# Patient Record
Sex: MALE | Race: WHITE | NOT HISPANIC OR LATINO | Employment: OTHER | URBAN - METROPOLITAN AREA
[De-identification: names, ages, dates, MRNs, and addresses within clinical notes are randomized per-mention and may not be internally consistent; named-entity substitution may affect disease eponyms.]

---

## 2017-02-21 ENCOUNTER — OFFICE VISIT (OUTPATIENT)
Dept: NEUROLOGY | Facility: CLINIC | Age: 67
End: 2017-02-21
Payer: MEDICARE

## 2017-02-21 VITALS
DIASTOLIC BLOOD PRESSURE: 72 MMHG | SYSTOLIC BLOOD PRESSURE: 132 MMHG | HEART RATE: 66 BPM | WEIGHT: 178.81 LBS | BODY MASS INDEX: 31.68 KG/M2 | HEIGHT: 63 IN

## 2017-02-21 DIAGNOSIS — G25.0 ESSENTIAL TREMOR: Primary | ICD-10-CM

## 2017-02-21 PROCEDURE — 99203 OFFICE O/P NEW LOW 30 MIN: CPT | Mod: PBBFAC | Performed by: PSYCHIATRY & NEUROLOGY

## 2017-02-21 PROCEDURE — 99205 OFFICE O/P NEW HI 60 MIN: CPT | Mod: S$PBB,,, | Performed by: PSYCHIATRY & NEUROLOGY

## 2017-02-21 PROCEDURE — 99999 PR PBB SHADOW E&M-NEW PATIENT-LVL III: CPT | Mod: PBBFAC,,, | Performed by: PSYCHIATRY & NEUROLOGY

## 2017-02-21 RX ORDER — AMILORIDE HYDROCHLORIDE 5 MG/1
5 TABLET ORAL DAILY
Refills: 3 | Status: ON HOLD | COMMUNITY
End: 2023-02-08 | Stop reason: HOSPADM

## 2017-02-21 RX ORDER — ATENOLOL 50 MG/1
50 TABLET ORAL DAILY
Refills: 1 | Status: ON HOLD | COMMUNITY
End: 2023-02-08 | Stop reason: HOSPADM

## 2017-02-21 RX ORDER — OMEPRAZOLE 40 MG/1
40 CAPSULE, DELAYED RELEASE ORAL DAILY
Refills: 3 | Status: ON HOLD | COMMUNITY
End: 2023-03-07 | Stop reason: HOSPADM

## 2017-02-21 RX ORDER — FUROSEMIDE 40 MG/1
40 TABLET ORAL DAILY
Refills: 3 | COMMUNITY

## 2017-02-21 RX ORDER — LACTULOSE 10 G/15ML
15 SOLUTION ORAL; RECTAL 3 TIMES DAILY
Refills: 3 | COMMUNITY
Start: 2017-01-17 | End: 2023-02-05 | Stop reason: CLARIF

## 2017-02-21 NOTE — PROGRESS NOTES
Name: Regino Cowan  MRN: [id number]   CSN: [id number]      Date: 02/21/2017    Chief Complaint / Interval History:  tremors    History of Present Illness (HPI):  65 yo RH here with tremors, recovering alcoholic (sober for 11 years).  Moved here 2 months ago to be closer to her daughter who lives here ([place]).      Tremor of hands for about a year, sl ow progerssive, with posture and kinesis, sl worse on R.  Affects adls.  Frustrating.  NO falls or near falls.  Very rarely at rest.    Opening [facility] in [place] soon.    Will email:  Contact information  [address] [place] [address]  Office: [phone]  Fax: [phone]  Email:   [email or web address]     Kayla VELASCO DO  Family practice physician in [place], [place]  Address: [address] [place] [address] [address], [place] [address]  Phone: [phone]      Nonmotor/Premotor ROS:  Hyposmia (HENT)?No  RBD/sleep issues (Constitutional)?No  Depression/anxiety (Psychiatric)?No  Fatigue (Constitutional)?Yes  Constipation (GI)?No  Urinary issues ()?No  Sexual dysfunction ()?No  Orthostasis (Cardiovascular)?No  Leg swelling (Cardiovascular)? No  Falls (Musculoskeletal)?No  Cognitive impairment (Neurologic)?No  Psychoses (Psychiatric)?No  Pain/Paresthesia (Neurologic)?No  Visual changes (Eyes)?No  Moles / skin changes (Skin)?No  Stridor / SOB (Pulm)?No  Bruising (Heme)?No    Past Medical History: The patient  has no past medical history on file.    Social History: The patient  reports that he has quit smoking. He has never used smokeless tobacco. He reports that he does not drink alcohol or use illicit drugs.    Family History: Their family history is not on file.    Allergies: Review of patient's allergies indicates no known allergies.     Meds:   No current outpatient prescriptions on file prior to visit.     No current facility-administered medications on file prior to visit.        Exam:  Visit Vitals    /72 (BP  "Location: Left arm, Patient Position: Sitting, BP Method: Automatic)    Pulse 66    Ht 5' 3" (1.6 m)    Wt 81.1 kg (178 lb 12.7 oz)    BMI 31.67 kg/m2       Constitutional  Well-developed, well-nourished, appears stated age   Ophthalmoscopic  No papilledema with no hemorrhages or exudates bilaterally   Cardiovascular  Radial pulses 2+ and symmetric, no LE edema bilaterally   Neurological    * Mental status  MOCA deferred     - Orientation  Oriented to person, place, time, and situation     - Memory   Intact recent and remote     - Attention/concentration  Attentive, vigilant during exam     - Language  Naming & repetition intact, +2-step commands     - Fund of knowledge  Aware of current events     - Executive  Well-organized thoughts     - Other     * Cranial nerves       - CN II  PERRL, visual fields full to confrontation     - CN III, IV, VI  Extraocular movements full, normal pursuits and saccades     - CN V  Sensation V1 - V3 intact     - CN VII  Face strong and symmetric bilaterally     - CN VIII  Hearing intact bilaterally     - CN IX, X  Palate raises midline and symmetric     - CN XI  SCM and trapezius 5/5 bilaterally     - CN XII  Tongue midline   * Motor  Muscle bulk normal, strength 5/5 throughout   * Sensory   Intact to temperature and vibration throughout   * Coordination  No dysmetria with finger-to-nose or heel-to-shin   * Gait  See below.   * Deep tendon reflexes  2+ and symmetric throughout   Babinski downgoing bilaterally   * Specialized movement exam  Sl gravelly speech.   V. Mild facial masking.   No cogwheel rigidity.     No bradykinesia.   Mod postural and kinetic tremor of hands, none with rest or intention.  Arch spirals drawn for records.   No other dystonia, chorea, athetosis, myoclonus, or tics.   No motor impersistence.   Normal-based gait.   No shortened stride length.   + Minmially less R arm swing.     No postural instability.      Laboratory/Radiological:  - Results:No results " found for any previous visit.    - Independent review of images:    Diagnoses:          1) Probable essential tremor.  Could have rare myoclonic intrusions.  No convincing parkinsonism though a bit masked in the face.  Could also have uncovered and/or worsened if has hepatic-renal metabolic issues.    Medical Decision Making:  - reversible labs today  - no indication for imaging  - OT as needed  - will discuss add'l therapy after labs completed      James Palacios MD, MPH  Division of Movement and Memory Disorders  [facility]  [phone]    ====================  Results for ANA CAMACHO (MRN [id number]) as of 2/26/2017 15:36   Ref. Range 2/21/2017 16:24 2/21/2017 16:24   WBC Latest Ref Range: 3.90 - 12.70 K/uL 5.75    RBC Latest Ref Range: 4.60 - 6.20 M/uL 4.22 (L)    Hemoglobin Latest Ref Range: 14.0 - 18.0 g/dL 13.2 (L)    Hematocrit Latest Ref Range: 40.0 - 54.0 % 37.4 (L)    MCV Latest Ref Range: 82 - 98 fL 89    MCH Latest Ref Range: 27.0 - 31.0 pg 31.3 (H)    MCHC Latest Ref Range: 32.0 - 36.0 % 35.3    RDW Latest Ref Range: 11.5 - 14.5 % 13.4    Platelets Latest Ref Range: 150 - 350 K/uL 124 (L)    MPV Latest Ref Range: 9.2 - 12.9 fL 11.2    Gran% Latest Ref Range: 38.0 - 73.0 % 50.2    Gran # Latest Ref Range: 1.8 - 7.7 K/uL 2.9    Lymph% Latest Ref Range: 18.0 - 48.0 % 32.2    Lymph # Latest Ref Range: 1.0 - 4.8 K/uL 1.9    Mono% Latest Ref Range: 4.0 - 15.0 % 11.7    Mono # Latest Ref Range: 0.3 - 1.0 K/uL 0.7    Eosinophil% Latest Ref Range: 0.0 - 8.0 % 5.0    Eos # Latest Ref Range: 0.0 - 0.5 K/uL 0.3    Basophil% Latest Ref Range: 0.0 - 1.9 % 0.7    Baso # Latest Ref Range: 0.00 - 0.20 K/uL 0.04    Sodium Latest Ref Range: 136 - 145 mmol/L 140    Potassium Latest Ref Range: 3.5 - 5.1 mmol/L 3.7    Chloride Latest Ref Range: 95 - 110 mmol/L 108    CO2 Latest Ref Range: 23 - 29 mmol/L 27    Anion Gap Latest Ref Range: 8 - 16 mmol/L 5 (L)    BUN, Bld Latest Ref Range: 8 - 23 mg/dL  13    Creatinine Latest Ref Range: 0.5 - 1.4 mg/dL 1.6 (H)    eGFR if non African American Latest Ref Range: >60 mL/min/1.73 m^2 43.9 (A)    eGFR if African American Latest Ref Range: >60 mL/min/1.73 m^2 50.8 (A)    Glucose Latest Ref Range: 70 - 110 mg/dL 84    Calcium Latest Ref Range: 8.7 - 10.5 mg/dL 8.9    Alkaline Phosphatase Latest Ref Range: 55 - 135 U/L 120 120   Total Protein Latest Ref Range: 6.0 - 8.4 g/dL 6.9 6.9   Albumin Latest Ref Range: 3.5 - 5.2 g/dL 3.4 (L) 3.4 (L)   Total Bilirubin Latest Ref Range: 0.1 - 1.0 mg/dL 1.2 (H) 1.2 (H)   Bilirubin, Direct Latest Ref Range: 0.1 - 0.3 mg/dL  0.5 (H)   AST Latest Ref Range: 10 - 40 U/L 30 30   ALT Latest Ref Range: 10 - 44 U/L 17 17   Ammonia Latest Ref Range: 10 - 50 umol/L 53 (H)    Amylase Latest Ref Range: 20 - 110 U/L 119 (H)    Lipase Latest Ref Range: 4 - 60 U/L 70 (H)

## 2017-02-21 NOTE — MR AVS SNAPSHOT
[facility]  [address]  [facility] [address]  Phone: [phone]  Fax: [phone]                  Regino Cowan   2017 3:20 PM   Office Visit    Description:  Male : 1950   Provider:  James Palacios MD   Department:  [facility]           Diagnoses this Visit        Comments    Essential tremor    -  Primary            To Do List           Future Appointments        Provider Department Dept Phone    2017 5:00 PM LAB, SAME DAY [facility] [phone]    2017 4:00 PM James Palacios MD [facility] [phone]      Goals (5 Years of Data)     None      [facility]     [facility] Nurse Care Line -  Assistance  Registered nurses in the [facility] provide clinical advisement, health education, appointment booking, and other advisory services.  Call for this free service at [phone].             Medications           Message regarding Medications     Verify the changes and/or additions to your medication regime listed below are the same as discussed with your clinician today.  If any of these changes or additions are incorrect, please notify your healthcare provider.             Verify that the below list of medications is an accurate representation of the medications you are currently taking.  If none reported, the list may be blank. If incorrect, please contact your healthcare provider. Carry this list with you in case of emergency.           Current Medications     amiloride (MIDAMOR) 5 MG Tab Take 5 mg by mouth 4 (four) times daily.    atenolol (TENORMIN) 50 MG tablet Take 50 mg by mouth once daily.    furosemide (LASIX) 40 MG tablet Take 40 mg by mouth 3 (three) times daily.    GENERLAC 10 gram/15 mL solution Take 1 Dose by mouth 3 (three) times daily.    multivitamin capsule Take 1 capsule by mouth once daily.    omeprazole (PRILOSEC) 40 MG capsule Take 40 mg by mouth once daily.          "  Clinical Reference Information           Your Vitals Were     BP Pulse Height Weight BMI    132/72 (BP Location: Left arm, Patient Position: Sitting, BP Method: Automatic) 66 5' 3" (1.6 m) 81.1 kg (178 lb 12.7 oz) 31.67 kg/m2      Blood Pressure          Most Recent Value    BP  132/72      Allergies as of 2/21/2017     No Known Allergies      Immunizations Administered on Date of Encounter - 2/21/2017     None      Orders Placed During Today's Visit     Future Labs/Procedures Expected by Expires    Ammonia  2/21/2017 4/22/2018    Amylase  2/21/2017 4/22/2018    CBC auto differential  2/21/2017 4/22/2018    Comprehensive metabolic panel  2/21/2017 4/22/2018    HEPATIC FUNCTION PANEL  2/21/2017 4/22/2018    LIPASE  2/21/2017 4/22/2018      Language Assistance Services     ATTENTION: Language assistance services are available, free of charge. Please call [phone].      ATENCIÓN: Si habla español, tiene a hernandez disposición servicios gratuitos de asistencia lingüística. Llame al [phone].     CHÚ Ý: N?u b?n nói Ti?ng Vi?t, có các d?ch v? h? tr? ngôn ng? mi?n phí dành cho b?n. G?i s? [phone].         [facility] complies with applicable Federal civil rights laws and does not discriminate on the basis of race, color, national origin, age, disability, or sex.        "

## 2017-05-22 ENCOUNTER — OFFICE VISIT (OUTPATIENT)
Dept: NEUROLOGY | Facility: CLINIC | Age: 67
End: 2017-05-22
Payer: MEDICARE

## 2017-05-22 VITALS
BODY MASS INDEX: 30.08 KG/M2 | DIASTOLIC BLOOD PRESSURE: 75 MMHG | HEIGHT: 63 IN | WEIGHT: 169.75 LBS | HEART RATE: 61 BPM | SYSTOLIC BLOOD PRESSURE: 131 MMHG

## 2017-05-22 DIAGNOSIS — G25.0 ESSENTIAL TREMOR: Primary | ICD-10-CM

## 2017-05-22 DIAGNOSIS — K86.89 PANCREATIC INSUFFICIENCY: ICD-10-CM

## 2017-05-22 PROCEDURE — 99215 OFFICE O/P EST HI 40 MIN: CPT | Mod: S$PBB,,, | Performed by: PSYCHIATRY & NEUROLOGY

## 2017-05-22 PROCEDURE — 99213 OFFICE O/P EST LOW 20 MIN: CPT | Mod: PBBFAC | Performed by: PSYCHIATRY & NEUROLOGY

## 2017-05-22 PROCEDURE — 99999 PR PBB SHADOW E&M-EST. PATIENT-LVL III: CPT | Mod: PBBFAC,,, | Performed by: PSYCHIATRY & NEUROLOGY

## 2017-05-22 NOTE — PROGRESS NOTES
Name: Regino Cowan  MRN: [id number]   CSN: [id number]      Date: 05/22/2017    Chief Complaint / Interval History:  tremors  - tremors have improved!  - he didn't get a call about the labs  - no abdom pain  - no diarrhea  - gait is normal    History of Present Illness (HPI):  67 yo RH here with tremors, recovering alcoholic (sober for 11 years).  Moved here 2 months ago to be closer to her daughter who lives here ([place]).      Tremor of hands for about a year, slow progressive, with posture and kinesis, sl worse on R.  Affects adls.  Frustrating.  NO falls or near falls.  Very rarely at rest.    Opening [facility] in [place] soon.    Will email:  Contact information  [address] [place] [address]  Office: [phone]  Fax: [phone]  Email:   [email or web address]     Kayla VELASCO DO  Family practice physician in [place], [place]  Address: [address] [place] [address] [address], [place] [address]  Phone: [phone]      Nonmotor/Premotor ROS:  Hyposmia (HENT)?No  RBD/sleep issues (Constitutional)?No  Depression/anxiety (Psychiatric)?No  Fatigue (Constitutional)?Yes  Constipation (GI)?No  Urinary issues ()?No  Sexual dysfunction ()?No  Orthostasis (Cardiovascular)?No  Leg swelling (Cardiovascular)? No  Falls (Musculoskeletal)?No  Cognitive impairment (Neurologic)?No  Psychoses (Psychiatric)?No  Pain/Paresthesia (Neurologic)?No  Visual changes (Eyes)?No  Moles / skin changes (Skin)?No  Stridor / SOB (Pulm)?No  Bruising (Heme)?No    Past Medical History: The patient  has no past medical history on file.    Social History: The patient  reports that he has quit smoking. He has never used smokeless tobacco. He reports that he does not drink alcohol or use drugs.    Family History: Their family history is not on file.    Allergies: Review of patient's allergies indicates no known allergies.     Meds:   Current Outpatient Prescriptions on File Prior to Visit   Medication Sig  "Dispense Refill    amiloride (MIDAMOR) 5 MG Tab Take 5 mg by mouth 4 (four) times daily.  3    atenolol (TENORMIN) 50 MG tablet Take 50 mg by mouth once daily.  1    furosemide (LASIX) 40 MG tablet Take 40 mg by mouth 3 (three) times daily.  3    GENERLAC 10 gram/15 mL solution Take 1 Dose by mouth 3 (three) times daily.  3    multivitamin capsule Take 1 capsule by mouth once daily.      omeprazole (PRILOSEC) 40 MG capsule Take 40 mg by mouth once daily.  3     No current facility-administered medications on file prior to visit.        Exam:  /75 (BP Location: Left arm, Patient Position: Sitting, BP Method: Automatic)   Pulse 61   Ht 5' 3" (1.6 m)   Wt 77 kg (169 lb 12.1 oz)   BMI 30.07 kg/m²     * Specialized movement exam  Sl gravelly speech.   V. Mild facial masking.   No cogwheel rigidity.     MILD B bradykinesia.   VERY MINIMAL postural and kinetic tremor of hands, none with rest or intention.      No other dystonia, chorea, athetosis, myoclonus, or tics.   No motor impersistence.   Normal-based gait.   No shortened stride length.   + Minmially less R arm swing.     No postural instability.      Laboratory/Radiological:  - Results:  No visits with results within 3 Month(s) from this visit.   Latest known visit with results is:   Lab Visit on 02/21/2017   Component Date Value Ref Range Status    WBC 02/21/2017 5.75  3.90 - 12.70 K/uL Final    RBC 02/21/2017 4.22* 4.60 - 6.20 M/uL Final    Hemoglobin 02/21/2017 13.2* 14.0 - 18.0 g/dL Final    Hematocrit 02/21/2017 37.4* 40.0 - 54.0 % Final    MCV 02/21/2017 89  82 - 98 fL Final    MCH 02/21/2017 31.3* 27.0 - 31.0 pg Final    MCHC 02/21/2017 35.3  32.0 - 36.0 % Final    RDW 02/21/2017 13.4  11.5 - 14.5 % Final    Platelets 02/21/2017 124* 150 - 350 K/uL Final    MPV 02/21/2017 11.2  9.2 - 12.9 fL Final    Gran # 02/21/2017 2.9  1.8 - 7.7 K/uL Final    Lymph # 02/21/2017 1.9  1.0 - 4.8 K/uL Final    Mono # 02/21/2017 0.7  0.3 - 1.0 K/uL " Final    Eos # 02/21/2017 0.3  0.0 - 0.5 K/uL Final    Baso # 02/21/2017 0.04  0.00 - 0.20 K/uL Final    Gran% 02/21/2017 50.2  38.0 - 73.0 % Final    Lymph% 02/21/2017 32.2  18.0 - 48.0 % Final    Mono% 02/21/2017 11.7  4.0 - 15.0 % Final    Eosinophil% 02/21/2017 5.0  0.0 - 8.0 % Final    Basophil% 02/21/2017 0.7  0.0 - 1.9 % Final    Differential Method 02/21/2017 Automated   Final    Sodium 02/21/2017 140  136 - 145 mmol/L Final    Potassium 02/21/2017 3.7  3.5 - 5.1 mmol/L Final    Chloride 02/21/2017 108  95 - 110 mmol/L Final    CO2 02/21/2017 27  23 - 29 mmol/L Final    Glucose 02/21/2017 84  70 - 110 mg/dL Final    BUN, Bld 02/21/2017 13  8 - 23 mg/dL Final    Creatinine 02/21/2017 1.6* 0.5 - 1.4 mg/dL Final    Calcium 02/21/2017 8.9  8.7 - 10.5 mg/dL Final    Total Protein 02/21/2017 6.9  6.0 - 8.4 g/dL Final    Albumin 02/21/2017 3.4* 3.5 - 5.2 g/dL Final    Total Bilirubin 02/21/2017 1.2* 0.1 - 1.0 mg/dL Final    Alkaline Phosphatase 02/21/2017 120  55 - 135 U/L Final    AST 02/21/2017 30  10 - 40 U/L Final    ALT 02/21/2017 17  10 - 44 U/L Final    Anion Gap 02/21/2017 5* 8 - 16 mmol/L Final    eGFR if  02/21/2017 50.8* >60 mL/min/1.73 m^2 Final    eGFR if non African American 02/21/2017 43.9* >60 mL/min/1.73 m^2 Final    Ammonia 02/21/2017 53* 10 - 50 umol/L Final    Amylase 02/21/2017 119* 20 - 110 U/L Final    Lipase 02/21/2017 70* 4 - 60 U/L Final    Total Protein 02/21/2017 6.9  6.0 - 8.4 g/dL Final    Albumin 02/21/2017 3.4* 3.5 - 5.2 g/dL Final    Total Bilirubin 02/21/2017 1.2* 0.1 - 1.0 mg/dL Final    Bilirubin, Direct 02/21/2017 0.5* 0.1 - 0.3 mg/dL Final    AST 02/21/2017 30  10 - 40 U/L Final    ALT 02/21/2017 17  10 - 44 U/L Final    Alkaline Phosphatase 02/21/2017 120  55 - 135 U/L Final     - Independent review of images:    Diagnoses:            1) Probable essential tremor, now much improved - but also has rare myoclonic intrusions. A  bit more parkinsonism on exam today, but greatest issue might be his pancreas.  Getting follow-up labs now.    Medical Decision Making:  - labs  - no new meds till labs  - PT/OT      James Palacios MD, MPH  Division of Movement and Memory Disorders  [facility]  [phone]

## 2017-05-22 NOTE — LETTER
May 25, 2017      ARIEL Garza  [address]  [address]  [facility] [address]           [facility]  [address]  [facility] [address]  Phone: [phone]  Fax: [phone]          Patient: Regino Cowan   MR Number: [id number]   YOB: 1950   Date of Visit: 5/22/2017       Dear Ariel Garza:    Thank you for referring Regino Cowan to me for evaluation. Attached you will find relevant portions of my assessment and plan of care.    If you have questions, please do not hesitate to call me. I look forward to following Regino Cowan along with you.    Sincerely,        Enclosure  CC:  No Recipients    If you would like to receive this communication electronically, please contact [email or web address] or [phone] to request more information on [employer] access.    For providers and/or their staff who would like to refer a patient to [facility], please contact us through our one-stop-shop provider referral line, Brynn Bonilla, at [phone].    If you feel you have received this communication in error or would no longer like to receive these types of communications, please e-mail [email or web address]

## 2017-05-30 ENCOUNTER — TELEPHONE (OUTPATIENT)
Dept: NEUROLOGY | Facility: CLINIC | Age: 67
End: 2017-05-30

## 2017-05-30 PROBLEM — K86.89 PANCREATIC INSUFFICIENCY: Status: ACTIVE | Noted: 2017-05-30

## 2017-05-30 PROBLEM — G25.0 ESSENTIAL TREMOR: Status: ACTIVE | Noted: 2017-05-30

## 2017-05-30 NOTE — TELEPHONE ENCOUNTER
Called patient to schedule his appointment with hepatology and he stated that he rather have you call him regarding his results before any appointment is made!

## 2017-05-31 NOTE — TELEPHONE ENCOUNTER
----- Message from Chanel Sapp sent at 5/31/2017 12:55 PM CDT -----  Contact: sheila @ [phone]  Pt says dr rivera had a woman call him and tell him to go see a hepatologist.  He would like to know why he wants him to see this type of dr. garcía call.   Pt says it is urgent.

## 2017-06-02 ENCOUNTER — TELEPHONE (OUTPATIENT)
Dept: NEUROLOGY | Facility: CLINIC | Age: 67
End: 2017-06-02

## 2017-06-02 NOTE — TELEPHONE ENCOUNTER
----- Message from Tyesha España sent at 6/1/2017  2:35 PM CDT -----  Contact: Kimberly/ Dr. Arguello staff  Caller is requesting PT's lab results.     They can be faxed over;   [phone].    You can reach Kimberly at [phone].

## 2017-06-02 NOTE — TELEPHONE ENCOUNTER
Called Kimberly and left a voicemail informing her that DR. Palacios is out of the office and before any information is faxed off I will have to speak with him first.

## 2017-06-16 ENCOUNTER — TELEPHONE (OUTPATIENT)
Dept: NEUROLOGY | Facility: CLINIC | Age: 67
End: 2017-06-16

## 2017-06-16 NOTE — TELEPHONE ENCOUNTER
Spoken to patient and she stated that he would like Dr. Palacios to call him regarding his lab results and that he would also like his labs to be faxed over to [facility] to Dr. Lowe.

## 2017-06-16 NOTE — TELEPHONE ENCOUNTER
----- Message from Gavi Marshall sent at 6/16/2017 10:19 AM CDT -----  Contact: Pt  Pt states appts were to be made on his behalf and  was to facetime him as well. He hasnt heard from anyone and is checking the status on things    Pt contact number [phone]  Thanks

## 2017-08-17 ENCOUNTER — TELEPHONE (OUTPATIENT)
Dept: NEUROLOGY | Facility: CLINIC | Age: 67
End: 2017-08-17

## 2017-08-17 DIAGNOSIS — G25.0 ESSENTIAL TREMOR: Primary | ICD-10-CM

## 2017-08-17 NOTE — TELEPHONE ENCOUNTER
"Long discussion.  I HAD called him before and we discussed the results (he forgot)!  He saw the docs at [facility] and they said his "labs were better" when he went up there.    Just talked to him again.    We need to send his labs from here and get the ones from there please!  Thanks Michelle!    glenys  "

## 2017-08-17 NOTE — TELEPHONE ENCOUNTER
----- Message from Felicity Meng sent at 8/17/2017 12:28 PM CDT -----  Contact: Self  Pt is calling regarding his test results.  Pt says he had the tests done several months ago and has not heard from anyone.   Says he's called several times since the tests.    He can be reached at [phone].    Thank you.

## 2017-08-21 NOTE — TELEPHONE ENCOUNTER
See other notes. Spoke to him in detail. We had previously missed each other. His liver and pancreatic function been followed  in [place]. Awaiting those records.

## 2018-02-08 PROBLEM — J18.9 PNEUMONIA OF RIGHT LOWER LOBE DUE TO INFECTIOUS ORGANISM: Status: ACTIVE | Noted: 2018-02-08

## 2018-02-08 PROBLEM — D72.829 LEUKOCYTOSIS: Status: ACTIVE | Noted: 2018-02-08

## 2022-05-11 PROBLEM — Z71.89 ACP (ADVANCE CARE PLANNING): Status: ACTIVE | Noted: 2022-05-11

## 2022-05-11 PROBLEM — I10 PRIMARY HYPERTENSION: Status: ACTIVE | Noted: 2022-05-11

## 2022-05-11 PROBLEM — E87.6 HYPOKALEMIA: Status: ACTIVE | Noted: 2022-05-11

## 2022-05-11 PROBLEM — G93.41 ACUTE METABOLIC ENCEPHALOPATHY: Status: ACTIVE | Noted: 2022-05-11

## 2023-02-05 PROBLEM — K76.82 HEPATIC ENCEPHALOPATHY: Status: ACTIVE | Noted: 2023-02-05

## 2023-02-05 PROBLEM — D69.6 THROMBOCYTOPENIA: Status: ACTIVE | Noted: 2023-02-05

## 2023-02-17 PROBLEM — N18.31 STAGE 3A CHRONIC KIDNEY DISEASE: Status: ACTIVE | Noted: 2023-02-17

## 2023-02-19 PROBLEM — R53.81 DEBILITY: Status: ACTIVE | Noted: 2023-02-19

## 2023-02-27 PROBLEM — Z00.8 ENCOUNTER FOR ASSESSMENT OF DECISION-MAKING CAPACITY: Status: ACTIVE | Noted: 2023-02-27

## 2023-03-13 ENCOUNTER — TELEPHONE (OUTPATIENT)
Dept: HEMATOLOGY/ONCOLOGY | Facility: CLINIC | Age: 73
End: 2023-03-13
Payer: MEDICARE

## 2023-03-13 NOTE — TELEPHONE ENCOUNTER
----- Message from Sara Beltre sent at 3/13/2023 10:53 AM CDT -----  Regarding: Pt Adv  Contact: [phone]  Pt's daughter calling to get refer for TIPS revision.   Please call and adv @ [phone]

## 2023-03-14 ENCOUNTER — TELEPHONE (OUTPATIENT)
Dept: TRANSPLANT | Facility: CLINIC | Age: 73
End: 2023-03-14
Payer: MEDICARE

## 2023-03-14 PROBLEM — Z75.8 DISCHARGE PLANNING ISSUES: Status: ACTIVE | Noted: 2023-03-14

## 2023-03-14 NOTE — TELEPHONE ENCOUNTER
Patient's daughter called and notified that we have received about 8 pages of medical records.  Information about the appointment reviewed again. Questions answered at this time.

## 2023-03-14 NOTE — TELEPHONE ENCOUNTER
Patient's daughter called and given information about  the new appointment scheduled in [place] on 3/21/23 at 2:30.  Informed that medical records have not been received at the time. Will continue to request records.

## 2023-03-14 NOTE — TELEPHONE ENCOUNTER
----- Message from Nelida Renae sent at 3/14/2023 12:37 PM CDT -----  Regarding: Returning a Missed Call      Caller: Keena from Dr. Carbajal's office      Returning call to:  Xena Jose       Caller can be reached @:   [phone]

## 2023-03-14 NOTE — TELEPHONE ENCOUNTER
Call received from the patient's daughter requesting an appointment to see Dr Messer for a TIPS revision.  Patient's daughter reports that Dr Carbajal's office sent the referral for an appointment.  Patient's referral or medical records have been not been received  from Dr Carbajal's office at this time.  Patient informed of available appointment times at [facility] and a sooner appointment was requested.  Call placed to Dr Carbajal's office to obtain patient's medical records, no answer message left for the nurse to call the office.    Patient's daughter called back and informed that the medical records from Dr Carbajal's office will be needed in order to have continuity of care for the patient. The patient's family stated that they are calling the office for records as well.  Office fax number given to the family.     TIP'S  originally done at [facility] (records in care everywhere).

## 2023-03-16 PROBLEM — R41.0 DELIRIUM: Status: ACTIVE | Noted: 2023-03-16

## 2023-03-21 ENCOUNTER — OFFICE VISIT (OUTPATIENT)
Dept: HEPATOLOGY | Facility: CLINIC | Age: 73
DRG: 442 | End: 2023-03-21
Payer: MEDICARE

## 2023-03-21 ENCOUNTER — HOSPITAL ENCOUNTER (INPATIENT)
Facility: HOSPITAL | Age: 73
LOS: 9 days | Discharge: HOSPICE | DRG: 442 | End: 2023-03-31
Attending: EMERGENCY MEDICINE | Admitting: INTERNAL MEDICINE
Payer: MEDICARE

## 2023-03-21 VITALS
BODY MASS INDEX: 25.58 KG/M2 | SYSTOLIC BLOOD PRESSURE: 102 MMHG | DIASTOLIC BLOOD PRESSURE: 70 MMHG | HEART RATE: 70 BPM | HEIGHT: 62 IN | WEIGHT: 139 LBS

## 2023-03-21 DIAGNOSIS — R94.31 QT PROLONGATION: ICD-10-CM

## 2023-03-21 DIAGNOSIS — K76.82 HEPATIC ENCEPHALOPATHY: Primary | ICD-10-CM

## 2023-03-21 DIAGNOSIS — R41.82 AMS (ALTERED MENTAL STATUS): ICD-10-CM

## 2023-03-21 DIAGNOSIS — G93.41 ACUTE METABOLIC ENCEPHALOPATHY: Primary | ICD-10-CM

## 2023-03-21 DIAGNOSIS — R45.1 AGITATION REQUIRING SEDATION PROTOCOL: ICD-10-CM

## 2023-03-21 DIAGNOSIS — K76.82 ACUTE HEPATIC ENCEPHALOPATHY: ICD-10-CM

## 2023-03-21 DIAGNOSIS — Z95.828 S/P TIPS (TRANSJUGULAR INTRAHEPATIC PORTOSYSTEMIC SHUNT): ICD-10-CM

## 2023-03-21 LAB
ALBUMIN SERPL BCP-MCNC: 3.4 G/DL (ref 3.5–5.2)
ALP SERPL-CCNC: 104 U/L (ref 55–135)
ALT SERPL W/O P-5'-P-CCNC: 24 U/L (ref 10–44)
AMMONIA PLAS-SCNC: 69 UMOL/L (ref 10–50)
AMPHET+METHAMPHET UR QL: NEGATIVE
ANION GAP SERPL CALC-SCNC: 14 MMOL/L (ref 8–16)
AST SERPL-CCNC: 38 U/L (ref 10–40)
BARBITURATES UR QL SCN>200 NG/ML: NEGATIVE
BASOPHILS # BLD AUTO: 0.05 K/UL (ref 0–0.2)
BASOPHILS NFR BLD: 0.7 % (ref 0–1.9)
BENZODIAZ UR QL SCN>200 NG/ML: NEGATIVE
BILIRUB SERPL-MCNC: 1.7 MG/DL (ref 0.1–1)
BILIRUB UR QL STRIP: NEGATIVE
BUN SERPL-MCNC: 18 MG/DL (ref 8–23)
BZE UR QL SCN: NEGATIVE
CALCIUM SERPL-MCNC: 9.2 MG/DL (ref 8.7–10.5)
CANNABINOIDS UR QL SCN: NEGATIVE
CHLORIDE SERPL-SCNC: 99 MMOL/L (ref 95–110)
CLARITY UR: CLEAR
CO2 SERPL-SCNC: 22 MMOL/L (ref 23–29)
COLOR UR: YELLOW
CREAT SERPL-MCNC: 2.2 MG/DL (ref 0.5–1.4)
CREAT UR-MCNC: 160.4 MG/DL (ref 23–375)
DIFFERENTIAL METHOD: ABNORMAL
EOSINOPHIL # BLD AUTO: 0.3 K/UL (ref 0–0.5)
EOSINOPHIL NFR BLD: 4.1 % (ref 0–8)
ERYTHROCYTE [DISTWIDTH] IN BLOOD BY AUTOMATED COUNT: 14.4 % (ref 11.5–14.5)
EST. GFR  (NO RACE VARIABLE): 31 ML/MIN/1.73 M^2
ETHANOL SERPL-MCNC: <10 MG/DL
GLUCOSE SERPL-MCNC: 101 MG/DL (ref 70–110)
GLUCOSE UR QL STRIP: NEGATIVE
HCT VFR BLD AUTO: 40 % (ref 40–54)
HGB BLD-MCNC: 14.1 G/DL (ref 14–18)
HGB UR QL STRIP: NEGATIVE
IMM GRANULOCYTES # BLD AUTO: 0.03 K/UL (ref 0–0.04)
IMM GRANULOCYTES NFR BLD AUTO: 0.4 % (ref 0–0.5)
KETONES UR QL STRIP: ABNORMAL
LEUKOCYTE ESTERASE UR QL STRIP: NEGATIVE
LYMPHOCYTES # BLD AUTO: 2.1 K/UL (ref 1–4.8)
LYMPHOCYTES NFR BLD: 29.6 % (ref 18–48)
MAGNESIUM SERPL-MCNC: 1.8 MG/DL (ref 1.6–2.6)
MCH RBC QN AUTO: 32 PG (ref 27–31)
MCHC RBC AUTO-ENTMCNC: 35.3 G/DL (ref 32–36)
MCV RBC AUTO: 91 FL (ref 82–98)
METHADONE UR QL SCN>300 NG/ML: NEGATIVE
MONOCYTES # BLD AUTO: 0.7 K/UL (ref 0.3–1)
MONOCYTES NFR BLD: 10 % (ref 4–15)
NEUTROPHILS # BLD AUTO: 3.9 K/UL (ref 1.8–7.7)
NEUTROPHILS NFR BLD: 55.2 % (ref 38–73)
NITRITE UR QL STRIP: NEGATIVE
NRBC BLD-RTO: 0 /100 WBC
OPIATES UR QL SCN: NEGATIVE
PCP UR QL SCN>25 NG/ML: NEGATIVE
PH UR STRIP: 6 [PH] (ref 5–8)
PLATELET # BLD AUTO: 178 K/UL (ref 150–450)
PMV BLD AUTO: 10.9 FL (ref 9.2–12.9)
POTASSIUM SERPL-SCNC: 3.7 MMOL/L (ref 3.5–5.1)
PROT SERPL-MCNC: 7.4 G/DL (ref 6–8.4)
PROT UR QL STRIP: NEGATIVE
RBC # BLD AUTO: 4.41 M/UL (ref 4.6–6.2)
SODIUM SERPL-SCNC: 135 MMOL/L (ref 136–145)
SP GR UR STRIP: 1.01 (ref 1–1.03)
TOXICOLOGY INFORMATION: NORMAL
TROPONIN I SERPL DL<=0.01 NG/ML-MCNC: 0.02 NG/ML (ref 0–0.03)
URN SPEC COLLECT METH UR: ABNORMAL
UROBILINOGEN UR STRIP-ACNC: NEGATIVE EU/DL
WBC # BLD AUTO: 7.09 K/UL (ref 3.9–12.7)

## 2023-03-21 PROCEDURE — G0378 HOSPITAL OBSERVATION PER HR: HCPCS

## 2023-03-21 PROCEDURE — 80307 DRUG TEST PRSMV CHEM ANLYZR: CPT | Performed by: EMERGENCY MEDICINE

## 2023-03-21 PROCEDURE — 99205 OFFICE O/P NEW HI 60 MIN: CPT | Mod: S$PBB,,, | Performed by: INTERNAL MEDICINE

## 2023-03-21 PROCEDURE — 25000003 PHARM REV CODE 250: Performed by: INTERNAL MEDICINE

## 2023-03-21 PROCEDURE — 93010 EKG 12-LEAD: ICD-10-PCS | Mod: ,,, | Performed by: INTERNAL MEDICINE

## 2023-03-21 PROCEDURE — 93010 ELECTROCARDIOGRAM REPORT: CPT | Mod: ,,, | Performed by: INTERNAL MEDICINE

## 2023-03-21 PROCEDURE — 93005 ELECTROCARDIOGRAM TRACING: CPT

## 2023-03-21 PROCEDURE — 83735 ASSAY OF MAGNESIUM: CPT | Performed by: NURSE PRACTITIONER

## 2023-03-21 PROCEDURE — 82077 ASSAY SPEC XCP UR&BREATH IA: CPT | Performed by: EMERGENCY MEDICINE

## 2023-03-21 PROCEDURE — 99214 OFFICE O/P EST MOD 30 MIN: CPT | Mod: PBBFAC,25 | Performed by: INTERNAL MEDICINE

## 2023-03-21 PROCEDURE — 99291 CRITICAL CARE FIRST HOUR: CPT

## 2023-03-21 PROCEDURE — 99999 PR PBB SHADOW E&M-EST. PATIENT-LVL IV: CPT | Mod: PBBFAC,,, | Performed by: INTERNAL MEDICINE

## 2023-03-21 PROCEDURE — 85025 COMPLETE CBC W/AUTO DIFF WBC: CPT | Performed by: NURSE PRACTITIONER

## 2023-03-21 PROCEDURE — 99205 PR OFFICE/OUTPT VISIT, NEW, LEVL V, 60-74 MIN: ICD-10-PCS | Mod: S$PBB,,, | Performed by: INTERNAL MEDICINE

## 2023-03-21 PROCEDURE — 84484 ASSAY OF TROPONIN QUANT: CPT | Performed by: NURSE PRACTITIONER

## 2023-03-21 PROCEDURE — 82140 ASSAY OF AMMONIA: CPT | Performed by: NURSE PRACTITIONER

## 2023-03-21 PROCEDURE — 80053 COMPREHEN METABOLIC PANEL: CPT | Performed by: NURSE PRACTITIONER

## 2023-03-21 PROCEDURE — 81003 URINALYSIS AUTO W/O SCOPE: CPT | Mod: 59 | Performed by: NURSE PRACTITIONER

## 2023-03-21 PROCEDURE — 99999 PR PBB SHADOW E&M-EST. PATIENT-LVL IV: ICD-10-PCS | Mod: PBBFAC,,, | Performed by: INTERNAL MEDICINE

## 2023-03-21 RX ORDER — PROPRANOLOL HYDROCHLORIDE 10 MG/1
10 TABLET ORAL 2 TIMES DAILY
Status: DISCONTINUED | OUTPATIENT
Start: 2023-03-21 | End: 2023-04-01 | Stop reason: HOSPADM

## 2023-03-21 RX ORDER — PANTOPRAZOLE SODIUM 40 MG/1
40 TABLET, DELAYED RELEASE ORAL DAILY
Status: DISCONTINUED | OUTPATIENT
Start: 2023-03-22 | End: 2023-04-01 | Stop reason: HOSPADM

## 2023-03-21 RX ORDER — GUAIFENESIN 100 MG/5ML
200 SOLUTION ORAL EVERY 4 HOURS PRN
Status: DISCONTINUED | OUTPATIENT
Start: 2023-03-21 | End: 2023-04-01 | Stop reason: HOSPADM

## 2023-03-21 RX ORDER — SODIUM CHLORIDE 9 MG/ML
INJECTION, SOLUTION INTRAVENOUS ONCE
Status: COMPLETED | OUTPATIENT
Start: 2023-03-21 | End: 2023-03-22

## 2023-03-21 RX ORDER — ZIPRASIDONE HYDROCHLORIDE 20 MG/1
20 CAPSULE ORAL
Status: COMPLETED | OUTPATIENT
Start: 2023-03-21 | End: 2023-03-21

## 2023-03-21 RX ORDER — LACTULOSE 10 G/15ML
20 SOLUTION ORAL 3 TIMES DAILY
Status: DISCONTINUED | OUTPATIENT
Start: 2023-03-22 | End: 2023-03-24

## 2023-03-21 RX ORDER — FOLIC ACID 1 MG/1
1 TABLET ORAL DAILY
Status: DISCONTINUED | OUTPATIENT
Start: 2023-03-22 | End: 2023-04-01 | Stop reason: HOSPADM

## 2023-03-21 RX ORDER — THIAMINE HCL 100 MG
100 TABLET ORAL DAILY
Status: DISCONTINUED | OUTPATIENT
Start: 2023-03-22 | End: 2023-04-01 | Stop reason: HOSPADM

## 2023-03-21 RX ORDER — MAG HYDROX/ALUMINUM HYD/SIMETH 200-200-20
30 SUSPENSION, ORAL (FINAL DOSE FORM) ORAL EVERY 6 HOURS PRN
Status: DISCONTINUED | OUTPATIENT
Start: 2023-03-21 | End: 2023-04-01 | Stop reason: HOSPADM

## 2023-03-21 RX ORDER — FINASTERIDE 5 MG/1
5 TABLET, FILM COATED ORAL DAILY
Status: DISCONTINUED | OUTPATIENT
Start: 2023-03-22 | End: 2023-04-01 | Stop reason: HOSPADM

## 2023-03-21 RX ORDER — ONDANSETRON 2 MG/ML
4 INJECTION INTRAMUSCULAR; INTRAVENOUS EVERY 8 HOURS PRN
Status: DISCONTINUED | OUTPATIENT
Start: 2023-03-21 | End: 2023-03-22

## 2023-03-21 RX ADMIN — ZIPRASIDONE HYDROCHLORIDE 20 MG: 20 CAPSULE ORAL at 09:03

## 2023-03-21 RX ADMIN — SODIUM CHLORIDE: 9 INJECTION, SOLUTION INTRAVENOUS at 10:03

## 2023-03-21 RX ADMIN — PROPRANOLOL HYDROCHLORIDE 10 MG: 10 TABLET ORAL at 11:03

## 2023-03-21 RX ADMIN — RIFAXIMIN 550 MG: 550 TABLET ORAL at 09:03

## 2023-03-21 NOTE — ED NOTES
Diuresing with lasix IV    Problem: Urinary Tract Infection  Goal: Urinary Tract Infection (UTI) s/s resolved  Outcome: Outcome Met, Continue evaluating goal progress toward completion  Goal: Verbalizes s/s of UTI and treatment plan  Description: Document on Patient Education Activity   Outcome: Outcome Met, Continue evaluating goal progress toward completion     Problem: Breathing Pattern Ineffective  Goal: Air exchange is effective, demonstrated by Sp02 sat of greater then or = 92% (or as ordered)  Outcome: Outcome Met, Continue evaluating goal progress toward completion  Goal: Respiratory pattern is quiet and regular without report of SOB  Outcome: Outcome Met, Continue evaluating goal progress toward completion  Goal: Breathing pattern demonstrates minimal apnea during sleep with appropriate use of airway pressure support devices  Outcome: Outcome Met, Continue evaluating goal progress toward completion  Goal: Verbalizes/demonstrates effective breathing management strategies  Description: Document education using the patient education activity.   Outcome: Outcome Met, Continue evaluating goal progress toward completion     Problem: At Risk for Falls  Goal: # Patient does not fall  Outcome: Outcome Met, Continue evaluating goal progress toward completion  Goal: # Takes action to control fall-related risks  Outcome: Outcome Met, Continue evaluating goal progress toward completion  Goal: # Verbalizes understanding of fall risk/precautions  Description: Document education using the patient education activity  Outcome: Outcome Met, Continue evaluating goal progress toward completion     Problem: Cardiac Rhythm Disturbances with or without Devices  Goal: Hemodynamic stability achieved/maintained  Outcome: Outcome Met, Continue evaluating goal progress toward completion  Goal: Anxiety is controlled  Outcome: Outcome Met, Continue evaluating goal progress toward completion  Goal: Participates in ADL/Activity without s/s  Patient changed into hospital gown and placed on continuous pulse ox, blood pressure, and cardiac monitor. Call light within reach of patient. Family at bedside.   of intolerance  Outcome: Outcome Met, Continue evaluating goal progress toward completion  Goal: Urinary elimination pattern returned to baseline  Description: Patient must be making adequate amounts of urine output (240cc/8 hours) and voiding. If indwelling urinary catheter: Remove asap (no later an Day 2 or provider must specify reason for continued use).  Outcome: Outcome Met, Continue evaluating goal progress toward completion  Goal: Verbalizes understanding of rhythm disturbance, treatment procedure and pre, post-, and d/c care specific to intervention  Description: Document on Patient Education Activity  Outcome: Outcome Met, Continue evaluating goal progress toward completion     Problem: Fluid Volume Excess, Risk for  Goal: # Absence of Rapid Weight Gain (no more than 2kg in 24 hours)  Description: FVE Risk Patients may gain weight (but not more than 2 kg) but may not require aggressive treatment if in the absence of dyspnea; FVE (actual) patients should be monitored to achieve no weight gain.   Outcome: Outcome Met, Continue evaluating goal progress toward completion  Goal: # Absence of New Onset Dyspnea  Description: Dyspnea greater than SOB with Activity may be indicator of fluid volume excess  Outcome: Outcome Met, Continue evaluating goal progress toward completion  Goal: # Verbalizes understanding of FVE prevention plan  Description: Document on Patient Education Activity  Outcome: Outcome Met, Continue evaluating goal progress toward completion     Problem: Fluid Volume Excess  Goal: # Fluid Volume Excess Symptoms Resolved  Description: Treatment often consists of oxygen and respiratory support with diuretic therapy at doses that exceed usual dose (typically doubled).  Monitor patient response to treatment.    Acute dyspnea should resolve quickly if dose is adequate and kidney function is adequate. Dyspnea/SOB should only be observed with Activity after effective treatment. Patient should be able to  lie down comfortably, without SOB.  Outcome: Outcome Met, Continue evaluating goal progress toward completion  Goal: # Oxygenation is maintained (SpO2 greater than or equal to 90% or as ordered)  Outcome: Outcome Met, Continue evaluating goal progress toward completion  Goal: # Verbalizes understanding of FVE management plan  Description: Document on Patient Education Activity  Outcome: Outcome Met, Continue evaluating goal progress toward completion

## 2023-03-21 NOTE — PHARMACY MED REC
"Admission Medication History     The home medication history was taken by Brody Nielsen.    You may go to "Admission" then "Reconcile Home Medications" tabs to review and/or act upon these items.     The home medication list has been updated by the Pharmacy department.   Please read ALL comments highlighted in yellow.   Please address this information as you see fit.    Feel free to contact us if you have any questions or require assistance.      Medications listed below were obtained from: Analytic software- "[employer]" and Medical records  (Not in a hospital admission)      Brody Nielsen  [id number]    Current Outpatient Medications on File Prior to Encounter   Medication Sig Dispense Refill Last Dose    acetaminophen (TYLENOL) 325 MG tablet Take 2 tablets (650 mg total) by mouth every 8 (eight) hours as needed for Temperature greater than (or equal to 101 degree F).  0     DULoxetine (CYMBALTA) 30 MG capsule Take 1 capsule (30 mg total) by mouth once daily. 30 capsule 11     finasteride (PROSCAR) 5 mg tablet Take 1 tablet (5 mg total) by mouth once daily. 30 tablet 11     folic acid (FOLVITE) 1 MG tablet Take 1 tablet (1 mg total) by mouth once daily. 30 tablet 0     furosemide (LASIX) 40 MG tablet Take 40 mg by mouth once daily.   3     lactulose (CHRONULAC) 20 gram/30 mL Soln Take 30 mLs (20 g total) by mouth 3 (three) times daily. 2700 mL 3     lactulose (CHRONULAC) 20 gram/30 mL Soln Take 30 mLs (20 g total) by mouth daily as needed. Extra dosing for increased confusion or constipation/reduced bowel movements less then 3x daily 900 mL 1     multivitamin Tab Take 1 tablet by mouth once daily.       pantoprazole (PROTONIX) 40 MG tablet Take 1 tablet (40 mg total) by mouth once daily. 30 tablet 11     potassium chloride (KLOR-CON) 10 MEQ TbSR Take 10 mEq by mouth once daily.       propranoloL (INDERAL) 10 MG tablet Take 10 mg by mouth 2 (two) times daily.       rifAXIMin (XIFAXAN) 550 mg Tab Take 550 mg by " mouth 2 (two) times daily.       spironolactone (ALDACTONE) 25 MG tablet Take 1 tablet (25 mg total) by mouth once daily. 30 tablet 11     thiamine 100 MG tablet Take 1 tablet (100 mg total) by mouth once daily. 30 tablet 0                            .

## 2023-03-21 NOTE — ED PROVIDER NOTES
SCRIBE #1 NOTE: I, Nancie Man, am scribing for, and in the presence of, Agus Casiano Jr., MD. I have scribed the entire note.       History     Chief Complaint   Patient presents with    Altered Mental Status     Hx. Of hepatic encephalopathy, released from [facility] today for same.      Review of patient's allergies indicates:  No Known Allergies      History of Present Illness     HPI    3/21/2023, 5:43 PM  History obtained from the daughter       History of Present Illness: Regino Cowan is a 72 y.o. male patient with a PMHx of hypertension and liver cirrhosis who presents to the Emergency Department for evaluation of AMS which onset several days PTA. Symptoms are constant and moderate in severity. Pt has a Hx of cirrhosis which progressed to hepatic encephalopathy. He was seen at [facility] earlier today for same sxs. Daughter states that he has been speaking random statements and having visual hallucinations. This is the 4th hospital visit in 2 months.  Associated sxs include confusion and weakness. Patient denies any fever, v/d, SOB, CP, chills, and all other sxs at this time. Pt is on lactulose and and xifaxan. Pt stopped drinking alcohol about 17 years ago.  No further complaints or concerns at this time.       Arrival mode: Personal Vehicle    PCP: Rod Norman MD        Past Medical History:  Past Medical History:   Diagnosis Date    Hypertension     Liver cirrhosis        Past Surgical History:  History reviewed. No pertinent surgical history.      Family History:  Family History   Problem Relation Age of Onset    Heart disease Mother        Social History:  Social History     Tobacco Use    Smoking status: Former     Packs/day: 2.00     Years: 28.00     Pack years: 56.00     Types: Cigarettes    Smokeless tobacco: Never    Tobacco comments:     quit about 30 years ago    Substance and Sexual Activity    Alcohol use: No     Comment: quit about 10 years ago     Drug use: No    Sexual activity: Not on file        Review of Systems     Review of Systems   Constitutional:  Negative for chills and fever.   HENT:  Negative for sore throat.    Respiratory:  Negative for shortness of breath.    Cardiovascular:  Negative for chest pain.   Gastrointestinal:  Negative for diarrhea, nausea and vomiting.   Genitourinary:  Negative for dysuria.   Musculoskeletal:  Negative for back pain.   Skin:  Negative for rash.   Neurological:  Positive for weakness.   Hematological:  Does not bruise/bleed easily.   Psychiatric/Behavioral:  Positive for confusion.    All other systems reviewed and are negative.     Physical Exam     Initial Vitals [03/21/23 1551]   BP Pulse Resp Temp SpO2   118/82 69 18 98.9 °F (37.2 °C) 99 %      MAP       --          Physical Exam  Nursing Notes and Vital Signs Reviewed.  Constitutional: Patient is in no acute distress. Well-developed and well-nourished.  Head: Atraumatic. Normocephalic.  Eyes:  EOM intact.  No scleral icterus.  ENT: Mucous membranes are moist.  Nares clear   Neck:  Full ROM. No JVD.  Cardiovascular: Regular rate. Regular rhythm No murmurs, rubs, or gallops. Distal pulses are 2+ and symmetric  Pulmonary/Chest: No respiratory distress. Clear to auscultation bilaterally. No wheezing or rales.  Equal chest wall rise bilaterally  Abdominal: Soft and non-distended.  There is no tenderness.  No rebound, guarding, or rigidity. Good bowel sounds.  Genitourinary: No CVA tenderness.  No suprapubic tenderness  Musculoskeletal: Moves all extremities. No obvious deformities.  5 x 5 strength in all extremities   Skin: Warm and dry.  Neurological:  Alert, awake, and appropriate.  Normal speech.  No acute focal neurological deficits are appreciated.  Two through 12 intact bilaterally.  Patient is confused is slow to respond.  He is aware of the year the president appears be confused as to where he is.  Psychiatric: Normal affect. Good eye contact. Appropriate in  content.      ED Course   Critical Care    Date/Time: 3/31/2023 11:41 AM  Performed by: Agus Casiano Jr., MD  Authorized by: Samy King MD   Direct patient critical care time: 16 minutes  Additional history critical care time: 8 minutes  Ordering / reviewing critical care time: 12 minutes  Documentation critical care time: 14 minutes  Consulting other physicians critical care time: 5 minutes  Consult with family critical care time: 7 minutes  Total critical care time (exclusive of procedural time) : 62 minutes  Critical care time was exclusive of separately billable procedures and treating other patients and teaching time.  Critical care was necessary to treat or prevent imminent or life-threatening deterioration of the following conditions: metabolic crisis and hepatic failure.  Critical care was time spent personally by me on the following activities: development of treatment plan with patient or surrogate, discussions with consultants, evaluation of patient's response to treatment, examination of patient, obtaining history from patient or surrogate, ordering and performing treatments and interventions, ordering and review of laboratory studies, ordering and review of radiographic studies, pulse oximetry, re-evaluation of patient's condition and review of old charts.      ED Vital Signs:  Vitals:    03/29/23 0808 03/29/23 0809 03/29/23 1630 03/29/23 1905   BP:  (!) 100/54  (!) 109/57   Pulse: 73 73  81   Resp: 15 12 18 18   Temp: 98.1 °F (36.7 °C) 98.3 °F (36.8 °C)  98.2 °F (36.8 °C)   TempSrc:    Oral   SpO2:  97%  100%   Weight:       Height:        03/29/23 2338 03/30/23 0436 03/30/23 0751 03/30/23 1103   BP: 132/64 (!) 127/59 (!) 149/106 (!) 147/70   Pulse: 70 78 68 66   Resp: 17 17 16 15   Temp: 97.7 °F (36.5 °C) 97.7 °F (36.5 °C) 97.5 °F (36.4 °C)    TempSrc: Axillary Axillary Oral    SpO2: 95%  100% 99%   Weight: 64.6 kg (142 lb 6.7 oz)      Height:        03/30/23 1108 03/30/23 1417 03/30/23 1442  "03/30/23 1627   BP:    (!) 141/69   Pulse:    66   Resp:    15   Temp: 97.1 °F (36.2 °C)   98 °F (36.7 °C)   TempSrc:    Axillary   SpO2:    97%   Weight:  64.6 kg (142 lb 6.7 oz)     Height:  5' 4" (1.626 m) 5' 4" (1.626 m)     03/30/23 2037 03/31/23 0602 03/31/23 0739   BP: (!) 150/79 133/74 (!) 151/75   Pulse: 68 62 67   Resp: 18 18 20   Temp: 98.2 °F (36.8 °C) 97.5 °F (36.4 °C) 96.8 °F (36 °C)   TempSrc: Axillary Oral Axillary   SpO2: 97% 98% 98%   Weight:  57.4 kg (126 lb 8.7 oz)    Height:          Abnormal Lab Results:  Labs Reviewed   CBC W/ AUTO DIFFERENTIAL - Abnormal; Notable for the following components:       Result Value    RBC 4.41 (*)     MCH 32.0 (*)     All other components within normal limits   COMPREHENSIVE METABOLIC PANEL - Abnormal; Notable for the following components:    Sodium 135 (*)     CO2 22 (*)     Creatinine 2.2 (*)     Albumin 3.4 (*)     Total Bilirubin 1.7 (*)     eGFR 31 (*)     All other components within normal limits   URINALYSIS, REFLEX TO URINE CULTURE - Abnormal; Notable for the following components:    Ketones, UA Trace (*)     All other components within normal limits    Narrative:     Specimen Source->Urine   AMMONIA - Abnormal; Notable for the following components:    Ammonia 69 (*)     All other components within normal limits   MAGNESIUM   TROPONIN I   DRUG SCREEN PANEL, URINE EMERGENCY    Narrative:     Specimen Source->Urine   ALCOHOL,MEDICAL (ETHANOL)        All Lab Results:  Results for orders placed or performed during the hospital encounter of 03/21/23   CBC auto differential   Result Value Ref Range    WBC 7.09 3.90 - 12.70 K/uL    RBC 4.41 (L) 4.60 - 6.20 M/uL    Hemoglobin 14.1 14.0 - 18.0 g/dL    Hematocrit 40.0 40.0 - 54.0 %    MCV 91 82 - 98 fL    MCH 32.0 (H) 27.0 - 31.0 pg    MCHC 35.3 32.0 - 36.0 g/dL    RDW 14.4 11.5 - 14.5 %    Platelets 178 150 - 450 K/uL    MPV 10.9 9.2 - 12.9 fL    Immature Granulocytes 0.4 0.0 - 0.5 %    Gran # (ANC) 3.9 1.8 - 7.7 " K/uL    Immature Grans (Abs) 0.03 0.00 - 0.04 K/uL    Lymph # 2.1 1.0 - 4.8 K/uL    Mono # 0.7 0.3 - 1.0 K/uL    Eos # 0.3 0.0 - 0.5 K/uL    Baso # 0.05 0.00 - 0.20 K/uL    nRBC 0 0 /100 WBC    Gran % 55.2 38.0 - 73.0 %    Lymph % 29.6 18.0 - 48.0 %    Mono % 10.0 4.0 - 15.0 %    Eosinophil % 4.1 0.0 - 8.0 %    Basophil % 0.7 0.0 - 1.9 %    Differential Method Automated    Comprehensive metabolic panel   Result Value Ref Range    Sodium 135 (L) 136 - 145 mmol/L    Potassium 3.7 3.5 - 5.1 mmol/L    Chloride 99 95 - 110 mmol/L    CO2 22 (L) 23 - 29 mmol/L    Glucose 101 70 - 110 mg/dL    BUN 18 8 - 23 mg/dL    Creatinine 2.2 (H) 0.5 - 1.4 mg/dL    Calcium 9.2 8.7 - 10.5 mg/dL    Total Protein 7.4 6.0 - 8.4 g/dL    Albumin 3.4 (L) 3.5 - 5.2 g/dL    Total Bilirubin 1.7 (H) 0.1 - 1.0 mg/dL    Alkaline Phosphatase 104 55 - 135 U/L    AST 38 10 - 40 U/L    ALT 24 10 - 44 U/L    Anion Gap 14 8 - 16 mmol/L    eGFR 31 (A) >60 mL/min/1.73 m^2   Magnesium   Result Value Ref Range    Magnesium 1.8 1.6 - 2.6 mg/dL   Troponin I   Result Value Ref Range    Troponin I 0.019 0.000 - 0.026 ng/mL   Urinalysis, Reflex to Urine Culture Urine, Catheterized    Specimen: Urine   Result Value Ref Range    Specimen UA Urine, Catheterized     Color, UA Yellow Yellow, Straw, Racquel    Appearance, UA Clear Clear    pH, UA 6.0 5.0 - 8.0    Specific Gravity, UA 1.015 1.005 - 1.030    Protein, UA Negative Negative    Glucose, UA Negative Negative    Ketones, UA Trace (A) Negative    Bilirubin (UA) Negative Negative    Occult Blood UA Negative Negative    Nitrite, UA Negative Negative    Urobilinogen, UA Negative <2.0 EU/dL    Leukocytes, UA Negative Negative   Ammonia   Result Value Ref Range    Ammonia 69 (H) 10 - 50 umol/L   Drug screen panel, emergency   Result Value Ref Range    Benzodiazepines Negative Negative    Methadone metabolites Negative Negative    Cocaine (Metab.) Negative Negative    Opiate Scrn, Ur Negative Negative    Barbiturate  Screen, Ur Negative Negative    Amphetamine Screen, Ur Negative Negative    THC Negative Negative    Phencyclidine Negative Negative    Creatinine, Urine 160.4 23.0 - 375.0 mg/dL    Toxicology Information SEE COMMENT    Ethanol   Result Value Ref Range    Alcohol, Serum <10 <10 mg/dL   Ammonia   Result Value Ref Range    Ammonia 119 (H) 10 - 50 umol/L   CBC auto differential   Result Value Ref Range    WBC 6.30 3.90 - 12.70 K/uL    RBC 4.16 (L) 4.60 - 6.20 M/uL    Hemoglobin 13.1 (L) 14.0 - 18.0 g/dL    Hematocrit 36.9 (L) 40.0 - 54.0 %    MCV 89 82 - 98 fL    MCH 31.5 (H) 27.0 - 31.0 pg    MCHC 35.5 32.0 - 36.0 g/dL    RDW 14.2 11.5 - 14.5 %    Platelets 164 150 - 450 K/uL    MPV 10.7 9.2 - 12.9 fL    Immature Granulocytes 0.3 0.0 - 0.5 %    Gran # (ANC) 3.7 1.8 - 7.7 K/uL    Immature Grans (Abs) 0.02 0.00 - 0.04 K/uL    Lymph # 1.8 1.0 - 4.8 K/uL    Mono # 0.6 0.3 - 1.0 K/uL    Eos # 0.2 0.0 - 0.5 K/uL    Baso # 0.04 0.00 - 0.20 K/uL    nRBC 0 0 /100 WBC    Gran % 58.3 38.0 - 73.0 %    Lymph % 27.8 18.0 - 48.0 %    Mono % 10.0 4.0 - 15.0 %    Eosinophil % 3.0 0.0 - 8.0 %    Basophil % 0.6 0.0 - 1.9 %    Differential Method Automated    Comprehensive metabolic panel   Result Value Ref Range    Sodium 137 136 - 145 mmol/L    Potassium 3.5 3.5 - 5.1 mmol/L    Chloride 101 95 - 110 mmol/L    CO2 22 (L) 23 - 29 mmol/L    Glucose 104 70 - 110 mg/dL    BUN 21 8 - 23 mg/dL    Creatinine 2.1 (H) 0.5 - 1.4 mg/dL    Calcium 9.1 8.7 - 10.5 mg/dL    Total Protein 6.7 6.0 - 8.4 g/dL    Albumin 3.2 (L) 3.5 - 5.2 g/dL    Total Bilirubin 1.6 (H) 0.1 - 1.0 mg/dL    Alkaline Phosphatase 101 55 - 135 U/L    AST 34 10 - 40 U/L    ALT 23 10 - 44 U/L    Anion Gap 14 8 - 16 mmol/L    eGFR 33 (A) >60 mL/min/1.73 m^2   Magnesium   Result Value Ref Range    Magnesium 1.8 1.6 - 2.6 mg/dL   Comprehensive Metabolic Panel   Result Value Ref Range    Sodium 137 136 - 145 mmol/L    Potassium 3.8 3.5 - 5.1 mmol/L    Chloride 105 95 - 110 mmol/L     CO2 20 (L) 23 - 29 mmol/L    Glucose 82 70 - 110 mg/dL    BUN 22 8 - 23 mg/dL    Creatinine 1.7 (H) 0.5 - 1.4 mg/dL    Calcium 9.0 8.7 - 10.5 mg/dL    Total Protein 6.4 6.0 - 8.4 g/dL    Albumin 3.0 (L) 3.5 - 5.2 g/dL    Total Bilirubin 1.7 (H) 0.1 - 1.0 mg/dL    Alkaline Phosphatase 93 55 - 135 U/L    AST 38 10 - 40 U/L    ALT 23 10 - 44 U/L    Anion Gap 12 8 - 16 mmol/L    eGFR 42 (A) >60 mL/min/1.73 m^2   Ammonia   Result Value Ref Range    Ammonia 68 (H) 10 - 50 umol/L   Comprehensive Metabolic Panel   Result Value Ref Range    Sodium 133 (L) 136 - 145 mmol/L    Potassium 2.9 (L) 3.5 - 5.1 mmol/L    Chloride 102 95 - 110 mmol/L    CO2 23 23 - 29 mmol/L    Glucose 121 (H) 70 - 110 mg/dL    BUN 14 8 - 23 mg/dL    Creatinine 1.5 (H) 0.5 - 1.4 mg/dL    Calcium 8.3 (L) 8.7 - 10.5 mg/dL    Total Protein 5.4 (L) 6.0 - 8.4 g/dL    Albumin 2.5 (L) 3.5 - 5.2 g/dL    Total Bilirubin 1.4 (H) 0.1 - 1.0 mg/dL    Alkaline Phosphatase 88 55 - 135 U/L    AST 45 (H) 10 - 40 U/L    ALT 20 10 - 44 U/L    Anion Gap 8 8 - 16 mmol/L    eGFR 49 (A) >60 mL/min/1.73 m^2   Ammonia   Result Value Ref Range    Ammonia 81 (H) 10 - 50 umol/L   Magnesium   Result Value Ref Range    Magnesium 1.7 1.6 - 2.6 mg/dL   Comprehensive Metabolic Panel   Result Value Ref Range    Sodium 137 136 - 145 mmol/L    Potassium 4.5 3.5 - 5.1 mmol/L    Chloride 107 95 - 110 mmol/L    CO2 23 23 - 29 mmol/L    Glucose 109 70 - 110 mg/dL    BUN 11 8 - 23 mg/dL    Creatinine 1.4 0.5 - 1.4 mg/dL    Calcium 8.6 (L) 8.7 - 10.5 mg/dL    Total Protein 5.3 (L) 6.0 - 8.4 g/dL    Albumin 2.5 (L) 3.5 - 5.2 g/dL    Total Bilirubin 1.1 (H) 0.1 - 1.0 mg/dL    Alkaline Phosphatase 113 55 - 135 U/L    AST 50 (H) 10 - 40 U/L    ALT 24 10 - 44 U/L    Anion Gap 7 (L) 8 - 16 mmol/L    eGFR 53 (A) >60 mL/min/1.73 m^2   Magnesium   Result Value Ref Range    Magnesium 1.7 1.6 - 2.6 mg/dL   Comprehensive Metabolic Panel   Result Value Ref Range    Sodium 136 136 - 145 mmol/L    Potassium  4.1 3.5 - 5.1 mmol/L    Chloride 106 95 - 110 mmol/L    CO2 24 23 - 29 mmol/L    Glucose 102 70 - 110 mg/dL    BUN 12 8 - 23 mg/dL    Creatinine 1.2 0.5 - 1.4 mg/dL    Calcium 9.2 8.7 - 10.5 mg/dL    Total Protein 6.2 6.0 - 8.4 g/dL    Albumin 2.9 (L) 3.5 - 5.2 g/dL    Total Bilirubin 1.8 (H) 0.1 - 1.0 mg/dL    Alkaline Phosphatase 136 (H) 55 - 135 U/L    AST 45 (H) 10 - 40 U/L    ALT 27 10 - 44 U/L    Anion Gap 6 (L) 8 - 16 mmol/L    eGFR >60 >60 mL/min/1.73 m^2   Protime-INR   Result Value Ref Range    Prothrombin Time 13.2 (H) 9.0 - 12.5 sec    INR 1.2 0.8 - 1.2   COVID-19 Rapid Screening   Result Value Ref Range    SARS-CoV-2 RNA, Amplification, Qual Negative Negative   Magnesium   Result Value Ref Range    Magnesium 1.7 1.6 - 2.6 mg/dL   Comprehensive Metabolic Panel   Result Value Ref Range    Sodium 138 136 - 145 mmol/L    Potassium 4.1 3.5 - 5.1 mmol/L    Chloride 108 95 - 110 mmol/L    CO2 23 23 - 29 mmol/L    Glucose 96 70 - 110 mg/dL    BUN 16 8 - 23 mg/dL    Creatinine 1.0 0.5 - 1.4 mg/dL    Calcium 8.9 8.7 - 10.5 mg/dL    Total Protein 5.5 (L) 6.0 - 8.4 g/dL    Albumin 2.5 (L) 3.5 - 5.2 g/dL    Total Bilirubin 1.1 (H) 0.1 - 1.0 mg/dL    Alkaline Phosphatase 121 55 - 135 U/L    AST 43 (H) 10 - 40 U/L    ALT 22 10 - 44 U/L    Anion Gap 7 (L) 8 - 16 mmol/L    eGFR >60 >60 mL/min/1.73 m^2   Magnesium   Result Value Ref Range    Magnesium 1.6 1.6 - 2.6 mg/dL   Comprehensive Metabolic Panel   Result Value Ref Range    Sodium 141 136 - 145 mmol/L    Potassium 3.6 3.5 - 5.1 mmol/L    Chloride 113 (H) 95 - 110 mmol/L    CO2 22 (L) 23 - 29 mmol/L    Glucose 82 70 - 110 mg/dL    BUN 13 8 - 23 mg/dL    Creatinine 1.0 0.5 - 1.4 mg/dL    Calcium 8.6 (L) 8.7 - 10.5 mg/dL    Total Protein 4.8 (L) 6.0 - 8.4 g/dL    Albumin 2.3 (L) 3.5 - 5.2 g/dL    Total Bilirubin 1.1 (H) 0.1 - 1.0 mg/dL    Alkaline Phosphatase 86 55 - 135 U/L    AST 39 10 - 40 U/L    ALT 19 10 - 44 U/L    Anion Gap 6 (L) 8 - 16 mmol/L    eGFR >60 >60  mL/min/1.73 m^2   Magnesium   Result Value Ref Range    Magnesium 1.6 1.6 - 2.6 mg/dL   Comprehensive Metabolic Panel   Result Value Ref Range    Sodium 139 136 - 145 mmol/L    Potassium 3.9 3.5 - 5.1 mmol/L    Chloride 109 95 - 110 mmol/L    CO2 23 23 - 29 mmol/L    Glucose 102 70 - 110 mg/dL    BUN 15 8 - 23 mg/dL    Creatinine 1.2 0.5 - 1.4 mg/dL    Calcium 8.9 8.7 - 10.5 mg/dL    Total Protein 5.4 (L) 6.0 - 8.4 g/dL    Albumin 2.5 (L) 3.5 - 5.2 g/dL    Total Bilirubin 1.0 0.1 - 1.0 mg/dL    Alkaline Phosphatase 106 55 - 135 U/L    AST 41 (H) 10 - 40 U/L    ALT 22 10 - 44 U/L    Anion Gap 7 (L) 8 - 16 mmol/L    eGFR >60 >60 mL/min/1.73 m^2   Ammonia   Result Value Ref Range    Ammonia 77 (H) 10 - 50 umol/L   Ammonia   Result Value Ref Range    Ammonia 86 (H) 10 - 50 umol/L   CBC Auto Differential   Result Value Ref Range    WBC 6.24 3.90 - 12.70 K/uL    RBC 3.58 (L) 4.60 - 6.20 M/uL    Hemoglobin 11.5 (L) 14.0 - 18.0 g/dL    Hematocrit 32.1 (L) 40.0 - 54.0 %    MCV 90 82 - 98 fL    MCH 32.1 (H) 27.0 - 31.0 pg    MCHC 35.8 32.0 - 36.0 g/dL    RDW 14.8 (H) 11.5 - 14.5 %    Platelets 101 (L) 150 - 450 K/uL    MPV 10.0 9.2 - 12.9 fL    Immature Granulocytes 0.3 0.0 - 0.5 %    Gran # (ANC) 3.6 1.8 - 7.7 K/uL    Immature Grans (Abs) 0.02 0.00 - 0.04 K/uL    Lymph # 1.5 1.0 - 4.8 K/uL    Mono # 0.7 0.3 - 1.0 K/uL    Eos # 0.4 0.0 - 0.5 K/uL    Baso # 0.06 0.00 - 0.20 K/uL    nRBC 0 0 /100 WBC    Gran % 57.7 38.0 - 73.0 %    Lymph % 24.2 18.0 - 48.0 %    Mono % 10.7 4.0 - 15.0 %    Eosinophil % 6.1 0.0 - 8.0 %    Basophil % 1.0 0.0 - 1.9 %    Differential Method Automated    Comprehensive metabolic panel   Result Value Ref Range    Sodium 142 136 - 145 mmol/L    Potassium 3.8 3.5 - 5.1 mmol/L    Chloride 111 (H) 95 - 110 mmol/L    CO2 21 (L) 23 - 29 mmol/L    Glucose 82 70 - 110 mg/dL    BUN 13 8 - 23 mg/dL    Creatinine 0.8 0.5 - 1.4 mg/dL    Calcium 9.0 8.7 - 10.5 mg/dL    Total Protein 5.6 (L) 6.0 - 8.4 g/dL     Albumin 2.7 (L) 3.5 - 5.2 g/dL    Total Bilirubin 1.6 (H) 0.1 - 1.0 mg/dL    Alkaline Phosphatase 80 55 - 135 U/L    AST 53 (H) 10 - 40 U/L    ALT 24 10 - 44 U/L    Anion Gap 10 8 - 16 mmol/L    eGFR >60 >60 mL/min/1.73 m^2   POCT TB Skin Test Read   Result Value Ref Range    TB Induration 48 - 72 hr read 0 mm   POCT glucose   Result Value Ref Range    POCT Glucose 95 70 - 110 mg/dL   POCT glucose   Result Value Ref Range    POCT Glucose 84 70 - 110 mg/dL   POCT glucose   Result Value Ref Range    POCT Glucose 110 70 - 110 mg/dL        Imaging Results:  Imaging Results              CT Head Without Contrast (Final result)  Result time 03/21/23 18:42:02      Final result by Robyn Dubose MD (03/21/23 18:42:02)                   Impression:      No acute or adverse finding      Electronically signed by: Robyn Dubose  Date:    03/21/2023  Time:    18:42               Narrative:    EXAMINATION:  CT HEAD WITHOUT CONTRAST    CLINICAL HISTORY:  Mental status change, unknown cause;    TECHNIQUE:  Low dose axial images were obtained through the head.  Coronal and sagittal reformations were also performed. Contrast was not administered.    COMPARISON:  03/13/2023    FINDINGS:  Automated exposure technique utilized iterative technique 5    No acute intracranial hemorrhage or mass effect.  Chronic findings similar to prior.  Ventricle stable caliber.  No displaced calvarial fracture or adverse osseous finding                                       The EKG was ordered, reviewed, and independently interpreted by the ED provider.  Interpretation time: 16:10  Rate: 70 BPM  Rhythm: normal sinus rhythm  Interpretation: No acute ST changes. No STEMI.             The Emergency Provider reviewed the vital signs and test results, which are outlined above.     ED Discussion     9:00 PM: Discussed case with Jane Donnelly NP ([facility]). Dr. Olivo agrees with current care and management of pt and accepts admission.    Admitting Service: Hospital Medicine  Admitting Physician: Dr. Olivo  Admit to: obs med/tele     9:00 PM: Re-evaluated pt. I have discussed test results, shared treatment plan, and the need for admission with patient and family at bedside. Pt and family express understanding at this time and agree with all information. All questions answered. Pt and family have no further questions or concerns at this time. Pt is ready for admit.         Medical Decision Making:   History:   Old Medical Records: I decided to obtain old medical records.  Old Records Summarized: records from clinic visits and records from previous admission(s).       <> Summary of Records: I reviewed the patient's prior admission as well as that he was at  today prior to being referred for admission.  There is no note from  however.  Initial Assessment:   Patient is confused pleasantly.  Physical exam is otherwise benign.  Differential Diagnosis:   Infection versus stroke versus hepatic encephalopathy versus metabolic disturbance.  Clinical Tests:   Lab Tests: Ordered and Reviewed  Radiological Study: Ordered and Reviewed  Medical Tests: Ordered and Reviewed  ED Management:  Patient was evaluated history and physical examination.  I ordered and reviewed all laboratory studies EKGs and imaging.  Patient's EKG was normal sinus rhythm without STEMI.  CT scan of his head was interpreted as no adverse for acute findings.  All of his labs are benign save for creatinine of 2.2 and elevated ammonia at 69.  Light of patient's continued symptomatology and upon referral from GI who was not available upon the patient's arrival here hospital Medicine was consulted.  They have agreed to accept the patient in jobs med tele status to Dr. Olivo         ED Medication(s):  Medications   aluminum-magnesium hydroxide-simethicone 200-200-20 mg/5 mL suspension 30 mL (has no administration in time range)   guaiFENesin 100 mg/5 ml syrup 200 mg (has no administration  in time range)   rifAXIMin tablet 550 mg (550 mg Oral Given 3/31/23 1017)   propranoloL tablet 10 mg (10 mg Oral Given 3/31/23 1018)   folic acid tablet 1 mg (1 mg Oral Not Given 3/31/23 1040)   finasteride tablet 5 mg (5 mg Oral Not Given 3/31/23 1040)   thiamine tablet 100 mg (100 mg Oral Not Given 3/31/23 0900)   pantoprazole EC tablet 40 mg (40 mg Oral Not Given 3/31/23 1040)   lactulose 20 gram/30 mL solution Soln 20 g (20 g Oral Given 3/31/23 1029)   polyethylene glycol packet 17 g (17 g Oral Not Given 3/31/23 0900)   OLANZapine tablet 2.5 mg (2.5 mg Oral Given 3/31/23 1030)   OLANZapine tablet 7.5 mg (7.5 mg Oral Given 3/30/23 2205)   haloperidol lactate injection 2 mg (has no administration in time range)   0.9%  NaCl infusion (0 mL/hr Intravenous Stopped 3/22/23 0615)   ziprasidone capsule 20 mg (20 mg Oral Given 3/21/23 2128)   tuberculin injection 5 Units (5 Units Intradermal Given 3/23/23 1942)   potassium chloride SA CR tablet 40 mEq (40 mEq Oral Given 3/26/23 0016)   OLANZapine injection 5 mg (5 mg Intramuscular Given 3/29/23 2132)       Current Discharge Medication List        START taking these medications    Details   OLANZapine (ZYPREXA) 5 MG tablet Take 1 tablet (5 mg total) by mouth every evening.  Qty: 30 tablet, Refills: 11              Contact information for follow-up providers       Hannah Munoz MD. Schedule an appointment as soon as possible for a visit in 2 week(s).    Specialties: Gastroenterology, Hepatology  Contact information:  [address]  [place] [address] [phone]                       Contact information for after-discharge care       Destination       [facility] .    Service: Nursing Home  Contact information:  [address] [id number] [phone]                                       Scribe Attestation:   Scribe #1: I performed the above scribed service and the documentation accurately describes the services I  performed. I attest to the accuracy of the note.     Attending:   Physician Attestation Statement for Scribe #1: I, Agus Casiano Jr., MD, personally performed the services described in this documentation, as scribed by Nancie Man, in my presence, and it is both accurate and complete.           Clinical Impression       ICD-10-CM ICD-9-CM   1. Hepatic encephalopathy  K76.82 572.2   2. AMS (altered mental status)  R41.82 780.97   3. Acute hepatic encephalopathy  K76.82 572.2   4. QT prolongation  R94.31 794.31   5. Agitation requiring sedation protocol  R45.1 307.9       Disposition:   Disposition: Placed in Observation  Condition: Fair      Agus Casiano Jr., MD  03/21/23 2122       Agus Casiano Jr., MD  03/31/23 1142

## 2023-03-22 LAB
ALBUMIN SERPL BCP-MCNC: 3.2 G/DL (ref 3.5–5.2)
ALP SERPL-CCNC: 101 U/L (ref 55–135)
ALT SERPL W/O P-5'-P-CCNC: 23 U/L (ref 10–44)
AMMONIA PLAS-SCNC: 119 UMOL/L (ref 10–50)
ANION GAP SERPL CALC-SCNC: 14 MMOL/L (ref 8–16)
AST SERPL-CCNC: 34 U/L (ref 10–40)
BASOPHILS # BLD AUTO: 0.04 K/UL (ref 0–0.2)
BASOPHILS NFR BLD: 0.6 % (ref 0–1.9)
BILIRUB SERPL-MCNC: 1.6 MG/DL (ref 0.1–1)
BUN SERPL-MCNC: 21 MG/DL (ref 8–23)
CALCIUM SERPL-MCNC: 9.1 MG/DL (ref 8.7–10.5)
CHLORIDE SERPL-SCNC: 101 MMOL/L (ref 95–110)
CO2 SERPL-SCNC: 22 MMOL/L (ref 23–29)
CREAT SERPL-MCNC: 2.1 MG/DL (ref 0.5–1.4)
DIFFERENTIAL METHOD: ABNORMAL
EOSINOPHIL # BLD AUTO: 0.2 K/UL (ref 0–0.5)
EOSINOPHIL NFR BLD: 3 % (ref 0–8)
ERYTHROCYTE [DISTWIDTH] IN BLOOD BY AUTOMATED COUNT: 14.2 % (ref 11.5–14.5)
EST. GFR  (NO RACE VARIABLE): 33 ML/MIN/1.73 M^2
GLUCOSE SERPL-MCNC: 104 MG/DL (ref 70–110)
HCT VFR BLD AUTO: 36.9 % (ref 40–54)
HGB BLD-MCNC: 13.1 G/DL (ref 14–18)
IMM GRANULOCYTES # BLD AUTO: 0.02 K/UL (ref 0–0.04)
IMM GRANULOCYTES NFR BLD AUTO: 0.3 % (ref 0–0.5)
LYMPHOCYTES # BLD AUTO: 1.8 K/UL (ref 1–4.8)
LYMPHOCYTES NFR BLD: 27.8 % (ref 18–48)
MAGNESIUM SERPL-MCNC: 1.8 MG/DL (ref 1.6–2.6)
MCH RBC QN AUTO: 31.5 PG (ref 27–31)
MCHC RBC AUTO-ENTMCNC: 35.5 G/DL (ref 32–36)
MCV RBC AUTO: 89 FL (ref 82–98)
MONOCYTES # BLD AUTO: 0.6 K/UL (ref 0.3–1)
MONOCYTES NFR BLD: 10 % (ref 4–15)
NEUTROPHILS # BLD AUTO: 3.7 K/UL (ref 1.8–7.7)
NEUTROPHILS NFR BLD: 58.3 % (ref 38–73)
NRBC BLD-RTO: 0 /100 WBC
PLATELET # BLD AUTO: 164 K/UL (ref 150–450)
PMV BLD AUTO: 10.7 FL (ref 9.2–12.9)
POCT GLUCOSE: 95 MG/DL (ref 70–110)
POTASSIUM SERPL-SCNC: 3.5 MMOL/L (ref 3.5–5.1)
PROT SERPL-MCNC: 6.7 G/DL (ref 6–8.4)
RBC # BLD AUTO: 4.16 M/UL (ref 4.6–6.2)
SODIUM SERPL-SCNC: 137 MMOL/L (ref 136–145)
WBC # BLD AUTO: 6.3 K/UL (ref 3.9–12.7)

## 2023-03-22 PROCEDURE — 36415 COLL VENOUS BLD VENIPUNCTURE: CPT | Performed by: INTERNAL MEDICINE

## 2023-03-22 PROCEDURE — 11000001 HC ACUTE MED/SURG PRIVATE ROOM

## 2023-03-22 PROCEDURE — 99223 PR INITIAL HOSPITAL CARE,LEVL III: ICD-10-PCS | Mod: ,,, | Performed by: INTERNAL MEDICINE

## 2023-03-22 PROCEDURE — 83735 ASSAY OF MAGNESIUM: CPT | Performed by: INTERNAL MEDICINE

## 2023-03-22 PROCEDURE — 80053 COMPREHEN METABOLIC PANEL: CPT | Performed by: INTERNAL MEDICINE

## 2023-03-22 PROCEDURE — 25000003 PHARM REV CODE 250: Performed by: INTERNAL MEDICINE

## 2023-03-22 PROCEDURE — 85025 COMPLETE CBC W/AUTO DIFF WBC: CPT | Performed by: INTERNAL MEDICINE

## 2023-03-22 PROCEDURE — 82140 ASSAY OF AMMONIA: CPT | Performed by: INTERNAL MEDICINE

## 2023-03-22 PROCEDURE — 25000003 PHARM REV CODE 250: Performed by: NURSE PRACTITIONER

## 2023-03-22 PROCEDURE — 99223 1ST HOSP IP/OBS HIGH 75: CPT | Mod: ,,, | Performed by: INTERNAL MEDICINE

## 2023-03-22 PROCEDURE — 94761 N-INVAS EAR/PLS OXIMETRY MLT: CPT

## 2023-03-22 RX ORDER — ZIPRASIDONE MESYLATE 20 MG/ML
5 INJECTION, POWDER, LYOPHILIZED, FOR SOLUTION INTRAMUSCULAR EVERY 6 HOURS PRN
Status: DISCONTINUED | OUTPATIENT
Start: 2023-03-22 | End: 2023-03-23

## 2023-03-22 RX ORDER — LACTULOSE 10 G/15ML
200 SOLUTION ORAL; RECTAL 2 TIMES DAILY
Status: DISCONTINUED | OUTPATIENT
Start: 2023-03-22 | End: 2023-03-26

## 2023-03-22 RX ORDER — DEXTROMETHORPHAN POLISTIREX 30 MG/5 ML
1 SUSPENSION, EXTENDED RELEASE 12 HR ORAL ONCE
Status: DISCONTINUED | OUTPATIENT
Start: 2023-03-22 | End: 2023-03-22

## 2023-03-22 RX ADMIN — FINASTERIDE 5 MG: 5 TABLET, FILM COATED ORAL at 09:03

## 2023-03-22 RX ADMIN — LACTULOSE 200 G: 10 SOLUTION ORAL; RECTAL at 03:03

## 2023-03-22 RX ADMIN — LACTULOSE 20 G: 20 SOLUTION ORAL at 09:03

## 2023-03-22 RX ADMIN — THIAMINE HCL TAB 100 MG 100 MG: 100 TAB at 09:03

## 2023-03-22 RX ADMIN — RIFAXIMIN 550 MG: 550 TABLET ORAL at 09:03

## 2023-03-22 RX ADMIN — PROPRANOLOL HYDROCHLORIDE 10 MG: 10 TABLET ORAL at 09:03

## 2023-03-22 RX ADMIN — FOLIC ACID 1 MG: 1 TABLET ORAL at 09:03

## 2023-03-22 RX ADMIN — PANTOPRAZOLE SODIUM 40 MG: 40 TABLET, DELAYED RELEASE ORAL at 09:03

## 2023-03-22 NOTE — PLAN OF CARE
[facility] - Med Surg  Initial Discharge Assessment       Primary Care Provider: Rod Norman MD    Admission Diagnosis: Hepatic encephalopathy [K76.82]  AMS (altered mental status) [R41.82]    Admission Date: 3/21/2023  Expected Discharge Date: per attending         Payor: MEDICARE / Plan: MEDICARE PART A & B / Product Type: Government /     Extended Emergency Contact Information  Primary Emergency Contact: SofiaibanEleanor   [place]  Mobile Phone: [phone]  Relation: Daughter  Secondary Emergency Contact: Juventino Mendes   [place]  Mobile Phone: [phone]  Relation: Other    Discharge Plan A: New Nursing Home placement - assisted care facility         [id number] - [facility] [address] [facility] [address]  [address]  [facility] [address]  Phone: [phone] Fax: [phone]    [facility][id number] - [facility] [address] [facility] [address]  [address] [facility] [address]  [facility] [address]  Phone: [phone] Fax: [phone]    [facility] [place] [id number] [address]  Phone: [phone] Fax: [phone]      Initial Assessment (most recent)       Adult Discharge Assessment - 03/22/23 1310          Discharge Assessment    Assessment Type Discharge Planning Assessment     Confirmed/corrected address, phone number and insurance Yes     Confirmed Demographics Correct on Facesheet     Source of Information family     When was your last doctors appointment? --   n/a    Communicated CB with patient/caregiver Date not available/Unable to determine     Reason For Admission hepatic encephalopathy     People in Home alone     Do you expect to return to your current living situation? No     Do you have help at home or someone to help you manage your care at home? No     Prior to hospitilization cognitive  status: Alert/Oriented     Current cognitive status: Alert/Oriented     Equipment Currently Used at Home none     Readmission within 30 days? Yes     Do you currently have service(s) that help you manage your care at home? No     Do you take prescription medications? Yes     Do you have prescription coverage? Yes     Coverage medicare     Do you have any problems affording any of your prescribed medications? No     Is the patient taking medications as prescribed? yes     How do you get to doctors appointments? car, drives self;family or friend will provide     Are you on dialysis? No     Do you take coumadin? No     Discharge Plan A New Nursing Home placement - shelter care facility                      SW to f/u as needed.

## 2023-03-22 NOTE — ASSESSMENT & PLAN NOTE
-followed by Dr. Munoz in the hepatology clinic  -per GI recommendations from S , outpatient TIPS in the near future

## 2023-03-22 NOTE — PROGRESS NOTES
[facility] Medicine  Progress Note    Patient Name: Regino Cowan  MRN: [id number]  Patient Class: IP- Inpatient   Admission Date: 3/21/2023  Length of Stay: 0 days  Attending Physician: Linn Montoya DO  Primary Care Provider: Rod Norman MD    Subjective:     Principal Problem:Acute hepatic encephalopathy        HPI:  Mr. Cowan is a 72-year-old  male with PMH significant for liver cirrhosis, was hospitalized at [facility] for hepatic encephalopathy, and discharged earlier this afternoon.  Patient was treated with lactulose, rifaximin.  Developed delirium during this hospitalization, required treatment with Haldol, Geodon per Psychiatry.  Family wanted to pursue residential nursing home placement, process was initiated.  However patient had hepatology appointment today 3/21/23 with Dr. Munoz, hence was discharged from [facility].  Dr. Munoz evaluated patient in the clinic, and was sent to [facility] ED for admission for AMS and generalized deconditioning.  Ammonia level elevated 65.  Received lactulose in the ED. No family at the bedside.       Overview/Hospital Course:  Regino Johnson is a 72 year old male who was admitted to [facility] for hepatic encephalopathy. Patient presented to ED with AMS in setting of elevated ammonia. CT Head negative. Administration of PO lactulose difficult due to patient's agitation so hepatology recommended lactulose enemas.      Interval History: patient has not had a BM since admit. Added lactulose.    Review of Systems   Unable to perform ROS: Mental status change       Objective:     Vital Signs (Most Recent):  Temp: 97.5 °F (36.4 °C) (03/22/23 0836)  Pulse: 70 (03/22/23 1100)  Resp: 18 (03/22/23 0836)  BP: (!) 156/72 (03/22/23 0836)  SpO2: 100 % (03/22/23 0836)   Vital Signs (24h Range):  Temp:  [97.5 °F (36.4 °C)-98 °F (36.7 °C)] 97.5 °F (36.4 °C)  Pulse:  [58-71] 70  Resp:  [11-18] 18  SpO2:  [98 %-100 %]  100 %  BP: (116-160)/() 156/72     Weight: 57.5 kg (126 lb 12.2 oz)  Body mass index is 21.76 kg/m².    Intake/Output Summary (Last 24 hours) at 3/22/2023 1845  Last data filed at 3/22/2023 0615  Gross per 24 hour   Intake 582.91 ml   Output --   Net 582.91 ml      Physical Exam  Constitutional:       Appearance: He is ill-appearing.   HENT:      Head: Normocephalic and atraumatic.      Mouth/Throat:      Mouth: Mucous membranes are dry.   Eyes:      Conjunctiva/sclera: Conjunctivae normal.   Cardiovascular:      Rate and Rhythm: Normal rate and regular rhythm.   Pulmonary:      Effort: Pulmonary effort is normal.      Breath sounds: No wheezing.   Abdominal:      Tenderness: There is no abdominal tenderness.   Musculoskeletal:         General: No swelling or deformity.      Cervical back: Neck supple.   Skin:     Coloration: Skin is not jaundiced.      Comments: Bilateral wrist restraints     Neurological:      General: No focal deficit present.      Mental Status: He is alert. He is disoriented.   Psychiatric:         Behavior: Behavior is uncooperative and agitated.      Comments: Irritable mood. Poor insight         Significant Labs: All pertinent labs within the past 24 hours have been reviewed.  CBC:   Recent Labs   Lab 03/21/23  1636 03/22/23  1238   WBC 7.09 6.30   HGB 14.1 13.1*   HCT 40.0 36.9*    164     CMP:   Recent Labs   Lab 03/21/23  1636 03/22/23  1237   * 137   K 3.7 3.5   CL 99 101   CO2 22* 22*    104   BUN 18 21   CREATININE 2.2* 2.1*   CALCIUM 9.2 9.1   PROT 7.4 6.7   ALBUMIN 3.4* 3.2*   BILITOT 1.7* 1.6*   ALKPHOS 104 101   AST 38 34   ALT 24 23   ANIONGAP 14 14     Ammonia 119.   Significant Imaging: I have reviewed all pertinent imaging results/findings within the past 24 hours.      Assessment/Plan:      * Acute hepatic encephalopathy  -ammonia level elevated 65.    -lactulose 10 g p.o. t.i.d..    -repeat ammonia level in a.m..  -continue  rifaximin    3/22/23  Ammonia higher today  Add lactulose enemas. Can discontinue once patient has bowel movements  Hepatology input appreciated  Continue management as above    Delirium  -Geodon as needed  -avoid benzodiazepines   -delirium precautions    Patient was seen by psychiatry, Dr. Figueredo, on 3/15/23 who recommended continuing duloxetine 30mg BID and depakote 250 mg PO BID for delirium.   -will consult Telepsych for direction  -currently on ziprasidone IM prn      Debility  -per discharge summary earlier today, family seeking senior living NH placement.      Liver cirrhosis  -followed by Dr. Munoz in the hepatology clinic  -per GI recommendations from S , outpatient TIPS in the near future        VTE Risk Mitigation (From admission, onward)         Ordered     Place sequential compression device  Until discontinued         03/21/23 2059                Discharge Planning   CB:      Code Status: Full Code   Is the patient medically ready for discharge?:     Reason for patient still in hospital (select all that apply): Treatment  Discharge Plan A: New Nursing Home placement - senior living care facility                  Jeffrey Pham NP  Department of Hospital Medicine   [facility] - Med Surg

## 2023-03-22 NOTE — ASSESSMENT & PLAN NOTE
-Geodon as needed  -avoid benzodiazepines   -delirium precautions    Patient was seen by psychiatry, Dr. Figueredo, on 3/15/23 who recommended continuing duloxetine 30mg BID and depakote 250 mg PO BID for delirium.   -will consult Telepsych for direction  -currently on ziprasidone IM prn

## 2023-03-22 NOTE — HOSPITAL COURSE
Regino Johnson is a 72 year old male who was admitted to [facility] for hepatic encephalopathy. Patient presented to ED with AMS in setting of elevated ammonia. CT Head negative. Administration of PO lactulose difficult due to patient's agitation so hepatology recommended lactulose enemas.    03/23/23  Telepsych evaluated as patient had behavioral issues yesterday, recommended sitter or tele sitter at all times, he has no capacity to refuse necessary medical care at this time, Haldol PRN for agitation, and EKG if he receives Haldol. Agitation may have been from increased ammonia level as he had not had a BM. Overnight patient had several BMs post lactulose enemas. More alert and responsive today. Calm and cooperative. Ammonia level 68 from 119.  He has been accepted to [facility] for NH placement once medically stable.     3/24/23  Anticipate discharge to [facility] on Monday. Continue laxatives/Rifaximin for hepatic encephalopathy.     3/27/23  Reviewed overnight events. Haldol IM given. Patient awake, alert this morning, dressed in preperation to leave. NH requesting patient be out of restraints x 72 hours per  and nursing staff. Plan to d/c in AM if remains stable. Updated patient's daughter over phone.    3/28/23  Patient received dose of IM haldol again overnight due to reported agitation issues. Re-consulted tele-psychiatry, recommendations appreciated, started on Zyprexa 5 mg PO qHS. Patient resting in bed this afternoon, no new issues otherwise. Remains medically stable for transfer to SNF/NH.    3/29/23  NAEON. Started on zyprexa PO last night. Did not require IM haldol. Patient calm, awake, alert to self, place, situation. Patient refused morning meds, including lactulose. Encouraged patient to his medications as requested. Son-in-law at bedside, updated.    3/30/23  Overnight events reviewed, now in restraints and facemask applied as he  was noted to be spitting on staff. Patient is awake, oriented to self only, confused. Palliative Care to see today, appreciate assistance.    3/31/23  Palliative Care team held family discussion over phone this afternoon, decision for DNR status and inpatient hospice placement, please refer to Palliative Care note for further details. Stable for discharge to inpatient hospice.

## 2023-03-22 NOTE — ASSESSMENT & PLAN NOTE
-ammonia level elevated 65.    -lactulose 10 g p.o. t.i.d..    -repeat ammonia level in a.m..  -continue rifaximin

## 2023-03-22 NOTE — HPI
Mr. Cowan is a 72-year-old  male with PMH significant for liver cirrhosis, was hospitalized at [facility] for hepatic encephalopathy, and discharged earlier this afternoon.  Patient was treated with lactulose, rifaximin.  Developed delirium during this hospitalization, required treatment with Haldol, Geodon per Psychiatry.  Family wanted to pursue care home nursing home placement, process was initiated.  However patient had hepatology appointment today 3/21/23 with Dr. Munoz, hence was discharged from [facility].  Dr. Munoz evaluated patient in the clinic, and was sent to [facility] ED for admission for AMS and generalized deconditioning.  Ammonia level elevated 65.  Received lactulose in the ED. No family at the bedside.

## 2023-03-22 NOTE — SUBJECTIVE & OBJECTIVE
Interval History: patient has not had a BM since admit. Added lactulose.    Review of Systems   Unable to perform ROS: Mental status change       Objective:     Vital Signs (Most Recent):  Temp: 97.5 °F (36.4 °C) (03/22/23 0836)  Pulse: 70 (03/22/23 1100)  Resp: 18 (03/22/23 0836)  BP: (!) 156/72 (03/22/23 0836)  SpO2: 100 % (03/22/23 0836)   Vital Signs (24h Range):  Temp:  [97.5 °F (36.4 °C)-98 °F (36.7 °C)] 97.5 °F (36.4 °C)  Pulse:  [58-71] 70  Resp:  [11-18] 18  SpO2:  [98 %-100 %] 100 %  BP: (116-160)/() 156/72     Weight: 57.5 kg (126 lb 12.2 oz)  Body mass index is 21.76 kg/m².    Intake/Output Summary (Last 24 hours) at 3/22/2023 1845  Last data filed at 3/22/2023 0615  Gross per 24 hour   Intake 582.91 ml   Output --   Net 582.91 ml      Physical Exam  Constitutional:       Appearance: He is ill-appearing.   HENT:      Head: Normocephalic and atraumatic.      Mouth/Throat:      Mouth: Mucous membranes are dry.   Eyes:      Conjunctiva/sclera: Conjunctivae normal.   Cardiovascular:      Rate and Rhythm: Normal rate and regular rhythm.   Pulmonary:      Effort: Pulmonary effort is normal.      Breath sounds: No wheezing.   Abdominal:      Tenderness: There is no abdominal tenderness.   Musculoskeletal:         General: No swelling or deformity.      Cervical back: Neck supple.   Skin:     Coloration: Skin is not jaundiced.      Comments: Bilateral wrist restraints     Neurological:      General: No focal deficit present.      Mental Status: He is alert. He is disoriented.   Psychiatric:         Behavior: Behavior is uncooperative and agitated.      Comments: Irritable mood. Poor insight         Significant Labs: All pertinent labs within the past 24 hours have been reviewed.  CBC:   Recent Labs   Lab 03/21/23  1636 03/22/23  1238   WBC 7.09 6.30   HGB 14.1 13.1*   HCT 40.0 36.9*    164     CMP:   Recent Labs   Lab 03/21/23  1636 03/22/23  1237   * 137   K 3.7 3.5   CL 99 101   CO2 22* 22*     104   BUN 18 21   CREATININE 2.2* 2.1*   CALCIUM 9.2 9.1   PROT 7.4 6.7   ALBUMIN 3.4* 3.2*   BILITOT 1.7* 1.6*   ALKPHOS 104 101   AST 38 34   ALT 24 23   ANIONGAP 14 14     Ammonia 119.   Significant Imaging: I have reviewed all pertinent imaging results/findings within the past 24 hours.

## 2023-03-22 NOTE — CONSULTS
Patient was seen physically in the hospital      Subjective:     Regino Cowan is here for initial visit for cirrhosis    History of Present Illness:  Regino Cowan is a 72-year-old male with known diagnosis of alcohol-related cirrhosis of liver, takes lactulose at home regularly but has stopped taking as he wanted to work apparently.  He was admitted at [facility] with change in mental status, was in and out of the hospital for the last 2 weeks, family had him discharged from the hospital and brought him to see me in the clinic yesterday.  He reportedly had tips placed more than 10 years ago in [place], reason family is not aware of.  This a usually with lactulose his mentation quickly improves but this time he is just not back to his baseline.  They are unable to care for him at home due to his violent behavior    Mr. Palmer is somewhat oriented, was able to give me details of his lactulose accurately, given me information of his hospital stay but clearly is disoriented off and on during whole visit.       Review of Systems unable to complete    Objective:     Physical Exam  Vitals reviewed.   Constitutional:       Appearance: Normal appearance. He is obese. He is ill-appearing.   Eyes:      General: No scleral icterus.  Pulmonary:      Effort: Pulmonary effort is normal.   Abdominal:      General: Bowel sounds are normal. There is no distension.      Palpations: There is no mass.      Tenderness: There is no abdominal tenderness.   Musculoskeletal:      Right lower leg: No edema.      Left lower leg: No edema.   Skin:     Coloration: Skin is not jaundiced.   Neurological:      Mental Status: He is alert. He is disoriented.   Psychiatric:         Thought Content: Thought content normal.       MELD-Na score: 15 at 2/19/2023  4:54 AM  MELD score: 12 at 2/19/2023  4:54 AM  Calculated from:  Serum Creatinine: 1.16 mg/dL at 2/19/2023  4:54 AM  Serum Sodium: 134 mmol/L at 2/19/2023  4:54  AM  Total Bilirubin: 1.4 mg/dL at 2/18/2023  5:07 AM  INR(ratio): 1.3 at 2/17/2023 10:03 AM  Age: 72 years    WBC   Date Value Ref Range Status   03/22/2023 6.30 3.90 - 12.70 K/uL Final     Hemoglobin   Date Value Ref Range Status   03/22/2023 13.1 (L) 14.0 - 18.0 g/dL Final     Hematocrit   Date Value Ref Range Status   03/22/2023 36.9 (L) 40.0 - 54.0 % Final     Platelets   Date Value Ref Range Status   03/22/2023 164 150 - 450 K/uL Final     BUN   Date Value Ref Range Status   03/22/2023 21 8 - 23 mg/dL Final     Creatinine   Date Value Ref Range Status   03/22/2023 2.1 (H) 0.5 - 1.4 mg/dL Final     Glucose   Date Value Ref Range Status   03/22/2023 104 70 - 110 mg/dL Final     Calcium   Date Value Ref Range Status   03/22/2023 9.1 8.7 - 10.5 mg/dL Final     Sodium   Date Value Ref Range Status   03/22/2023 137 136 - 145 mmol/L Final     Potassium   Date Value Ref Range Status   03/22/2023 3.5 3.5 - 5.1 mmol/L Final     Chloride   Date Value Ref Range Status   03/22/2023 101 95 - 110 mmol/L Final     Magnesium   Date Value Ref Range Status   03/22/2023 1.8 1.6 - 2.6 mg/dL Final     AST   Date Value Ref Range Status   03/22/2023 34 10 - 40 U/L Final     ALT   Date Value Ref Range Status   03/22/2023 23 10 - 44 U/L Final     Alkaline Phosphatase   Date Value Ref Range Status   03/22/2023 101 55 - 135 U/L Final     Total Bilirubin   Date Value Ref Range Status   03/22/2023 1.6 (H) 0.1 - 1.0 mg/dL Final     Comment:     For infants and newborns, interpretation of results should be based  on gestational age, weight and in agreement with clinical  observations.    Premature Infant recommended reference ranges:  Up to 24 hours.............<8.0 mg/dL  Up to 48 hours............<12.0 mg/dL  3-5 days..................<15.0 mg/dL  6-29 days.................<15.0 mg/dL       Albumin   Date Value Ref Range Status   03/22/2023 3.2 (L) 3.5 - 5.2 g/dL Final     INR   Date Value Ref Range Status   02/17/2023 1.3  Final          Assessment/Plan:     1. Hepatic encephalopathy    2. AMS (altered mental status)    3. Alcohol-related cirrhosis of liver    Seems like even though he was in the hospital he was not having any bowel movements.  I doubt shunt occlusion is the etiology for his change in mental status, Doppler ultrasound was obtained and shunt is patent.  Encourage lactulose admission orally or as enema at dose 30-45 mL t.i.d. to q.i.d. as needed for minimum 2 bowel movements per day in spite of normal ammonia level.  Also need to look into other etiologies for change in mental status.     Last CT:  Results for orders placed or performed during the hospital encounter of 03/21/23 (from the past 2160 hour(s))   CT Head Without Contrast    Narrative    EXAMINATION:  CT HEAD WITHOUT CONTRAST    CLINICAL HISTORY:  Mental status change, unknown cause;    TECHNIQUE:  Low dose axial images were obtained through the head.  Coronal and sagittal reformations were also performed. Contrast was not administered.    COMPARISON:  03/13/2023    FINDINGS:  Automated exposure technique utilized iterative technique 5    No acute intracranial hemorrhage or mass effect.  Chronic findings similar to prior.  Ventricle stable caliber.  No displaced calvarial fracture or adverse osseous finding      Impression    No acute or adverse finding      Electronically signed by: Robyn Dubose  Date:    03/21/2023  Time:    18:42       Care coordinated with hospitalist team      Hannah Munoz MD  Transplant Hepatologist  Dept of Hepatology, [place] [facility]

## 2023-03-22 NOTE — PLAN OF CARE
03/22/23 1311   Post-Acute Status   Post-Acute Authorization Placement   Post-Acute Placement Status Referrals Sent   Coverage medicare   Discharge Plan   Discharge Plan A New Nursing Home placement - [facility] care facility     KUMAR spoke with pt's daughter who is agreement with NH placement. Daughter requested referrals be sent to places close to the [facility] . KUMAR explained the importance of finding placement. Pt's daughter is In agreement that if places deny in the [facility], back up referrals will be sent.

## 2023-03-22 NOTE — SUBJECTIVE & OBJECTIVE
Past Medical History:   Diagnosis Date    Hypertension     Liver cirrhosis        History reviewed. No pertinent surgical history.    Review of patient's allergies indicates:  No Known Allergies    Current Facility-Administered Medications on File Prior to Encounter   Medication    [COMPLETED] potassium chloride SA CR tablet 20 mEq    [DISCONTINUED] acetaminophen tablet 650 mg    [DISCONTINUED] DULoxetine DR capsule 30 mg    [DISCONTINUED] finasteride tablet 5 mg    [DISCONTINUED] folic acid tablet 1 mg    [DISCONTINUED] furosemide tablet 40 mg    [DISCONTINUED] lactulose 20 gram/30 mL solution Soln 30 g    [DISCONTINUED] melatonin tablet 6 mg    [DISCONTINUED] multivitamin tablet    [DISCONTINUED] ondansetron injection 4 mg    [DISCONTINUED] pantoprazole EC tablet 40 mg    [DISCONTINUED] propranoloL tablet 10 mg    [DISCONTINUED] rifAXIMin tablet 550 mg    [DISCONTINUED] sodium chloride 0.9% flush 10 mL    [DISCONTINUED] spironolactone tablet 25 mg    [DISCONTINUED] thiamine tablet 100 mg    [DISCONTINUED] ziprasidone injection 5 mg    [DISCONTINUED] ziprasidone injection 5 mg     Current Outpatient Medications on File Prior to Encounter   Medication Sig    acetaminophen (TYLENOL) 325 MG tablet Take 2 tablets (650 mg total) by mouth every 8 (eight) hours as needed for Temperature greater than (or equal to 101 degree F).    DULoxetine (CYMBALTA) 30 MG capsule Take 1 capsule (30 mg total) by mouth once daily.    finasteride (PROSCAR) 5 mg tablet Take 1 tablet (5 mg total) by mouth once daily.    folic acid (FOLVITE) 1 MG tablet Take 1 tablet (1 mg total) by mouth once daily.    furosemide (LASIX) 40 MG tablet Take 40 mg by mouth once daily.     lactulose (CHRONULAC) 20 gram/30 mL Soln Take 30 mLs (20 g total) by mouth 3 (three) times daily.    lactulose (CHRONULAC) 20 gram/30 mL Soln Take 30 mLs (20 g total) by mouth daily as needed. Extra dosing for increased confusion or constipation/reduced bowel movements less  then 3x daily    multivitamin Tab Take 1 tablet by mouth once daily.    pantoprazole (PROTONIX) 40 MG tablet Take 1 tablet (40 mg total) by mouth once daily.    potassium chloride (KLOR-CON) 10 MEQ TbSR Take 10 mEq by mouth once daily.    propranoloL (INDERAL) 10 MG tablet Take 10 mg by mouth 2 (two) times daily.    rifAXIMin (XIFAXAN) 550 mg Tab Take 550 mg by mouth 2 (two) times daily.    spironolactone (ALDACTONE) 25 MG tablet Take 1 tablet (25 mg total) by mouth once daily.    thiamine 100 MG tablet Take 1 tablet (100 mg total) by mouth once daily.    [DISCONTINUED] albuterol 90 mcg/actuation inhaler Inhale 2 puffs into the lungs every 6 (six) hours as needed for Wheezing. Rescue (Patient not taking: Reported on 5/11/2022)     Family History       Problem Relation (Age of Onset)    Heart disease Mother          Tobacco Use    Smoking status: Former     Packs/day: 2.00     Years: 28.00     Pack years: 56.00     Types: Cigarettes    Smokeless tobacco: Never    Tobacco comments:     quit about 30 years ago    Substance and Sexual Activity    Alcohol use: No     Comment: quit about 10 years ago    Drug use: No    Sexual activity: Not on file     Review of Systems   Unable to perform ROS: Mental status change   Constitutional:  Positive for appetite change.   Psychiatric/Behavioral:  Positive for confusion and decreased concentration.    Objective:     Vital Signs (Most Recent):  Temp: 98.9 °F (37.2 °C) (03/21/23 1551)  Pulse: 71 (03/21/23 2200)  Resp: 16 (03/21/23 2200)  BP: (!) 132/104 (03/21/23 2200)  SpO2: 98 % (03/21/23 2200)   Vital Signs (24h Range):  Temp:  [97.8 °F (36.6 °C)-98.9 °F (37.2 °C)] 98.9 °F (37.2 °C)  Pulse:  [65-98] 71  Resp:  [11-18] 16  SpO2:  [98 %-100 %] 98 %  BP: (102-146)/() 132/104     Weight: 63 kg (139 lb)  Body mass index is 27.15 kg/m².    Physical Exam  Constitutional:       Appearance: He is ill-appearing.   HENT:      Head: Normocephalic and atraumatic.      Mouth/Throat:       Mouth: Mucous membranes are dry.   Eyes:      Conjunctiva/sclera: Conjunctivae normal.   Cardiovascular:      Rate and Rhythm: Normal rate and regular rhythm.   Pulmonary:      Effort: Pulmonary effort is normal.      Breath sounds: No wheezing.   Abdominal:      Tenderness: There is no abdominal tenderness.   Musculoskeletal:         General: No swelling or deformity.      Cervical back: Neck supple.   Skin:     Coloration: Skin is not jaundiced.   Neurological:      General: No focal deficit present.      Mental Status: He is alert. He is disoriented.           Significant Labs: All pertinent labs within the past 24 hours have been reviewed.  BMP:   Recent Labs   Lab 03/21/23  1636      *   K 3.7   CL 99   CO2 22*   BUN 18   CREATININE 2.2*   CALCIUM 9.2   MG 1.8     CBC:   Recent Labs   Lab 03/20/23  0731 03/21/23  1636   WBC 6.33 7.09   HGB 12.9* 14.1   HCT 36.5* 40.0   * 178     CMP:   Recent Labs   Lab 03/20/23  0731 03/21/23  1636   * 135*   K 3.2* 3.7   CL 99 99   CO2 26 22*   GLU 99 101   BUN 14 18   CREATININE 1.18 2.2*   CALCIUM 8.7 9.2   PROT 6.9 7.4   ALBUMIN 3.3* 3.4*   BILITOT 1.6* 1.7*   ALKPHOS 92 104   AST 42 38   ALT 27 24   ANIONGAP 10 14       Significant Imaging: I have reviewed all pertinent imaging results/findings within the past 24 hours.  I have reviewed and interpreted all pertinent imaging results/findings within the past 24 hours.    Imaging Results              CT Head Without Contrast (Final result)  Result time 03/21/23 18:42:02      Final result by Robyn Dubose MD (03/21/23 18:42:02)                   Impression:      No acute or adverse finding      Electronically signed by: Robyn Dubose  Date:    03/21/2023  Time:    18:42               Narrative:    EXAMINATION:  CT HEAD WITHOUT CONTRAST    CLINICAL HISTORY:  Mental status change, unknown cause;    TECHNIQUE:  Low dose axial images were obtained through the head.  Coronal and sagittal  reformations were also performed. Contrast was not administered.    COMPARISON:  03/13/2023    FINDINGS:  Automated exposure technique utilized iterative technique 5    No acute intracranial hemorrhage or mass effect.  Chronic findings similar to prior.  Ventricle stable caliber.  No displaced calvarial fracture or adverse osseous finding                                      I have independently reviewed all pertinent labs within the past 24 hours.    I have independently reviewed, visualized and interpreted all pertinent imaging results within the past 24 hours and discussed the findings with the ED physician, Dr. Casiano

## 2023-03-22 NOTE — H&P
[facility]  [facility] Medicine  History & Physical    Patient Name: Regino Cowan  MRN: [id number]  Patient Class: OP- Observation  Admission Date: 3/21/2023  Attending Physician: Loy Olivo MD   Primary Care Provider: Rod Norman MD         Patient information was obtained from patient, past medical records and ER records.     Subjective:     Principal Problem:Acute hepatic encephalopathy    Chief Complaint:   Chief Complaint   Patient presents with    Altered Mental Status     Hx. Of hepatic encephalopathy, released from [facility] today for same.         HPI: Mr. Cowan is a 72-year-old  male with PMH significant for liver cirrhosis, was hospitalized at [facility] for hepatic encephalopathy, and discharged earlier this afternoon.  Patient was treated with lactulose, rifaximin.  Developed delirium during this hospitalization, required treatment with Haldol, Geodon per Psychiatry.  Family wanted to pursue skilled nursing nursing home placement, process was initiated.  However patient had hepatology appointment today 3/21/23 with Dr. Munoz, hence was discharged from [facility].  Dr. Munoz evaluated patient in the clinic, and was sent to [facility] ED for admission for AMS and generalized deconditioning.  Ammonia level elevated 65.  Received lactulose in the ED. No family at the bedside.       Past Medical History:   Diagnosis Date    Hypertension     Liver cirrhosis        History reviewed. No pertinent surgical history.    Review of patient's allergies indicates:  No Known Allergies    Current Facility-Administered Medications on File Prior to Encounter   Medication    [COMPLETED] potassium chloride SA CR tablet 20 mEq    [DISCONTINUED] acetaminophen tablet 650 mg    [DISCONTINUED] DULoxetine DR capsule 30 mg    [DISCONTINUED] finasteride tablet 5 mg    [DISCONTINUED] folic acid tablet 1 mg    [DISCONTINUED] furosemide tablet 40 mg     [DISCONTINUED] lactulose 20 gram/30 mL solution Soln 30 g    [DISCONTINUED] melatonin tablet 6 mg    [DISCONTINUED] multivitamin tablet    [DISCONTINUED] ondansetron injection 4 mg    [DISCONTINUED] pantoprazole EC tablet 40 mg    [DISCONTINUED] propranoloL tablet 10 mg    [DISCONTINUED] rifAXIMin tablet 550 mg    [DISCONTINUED] sodium chloride 0.9% flush 10 mL    [DISCONTINUED] spironolactone tablet 25 mg    [DISCONTINUED] thiamine tablet 100 mg    [DISCONTINUED] ziprasidone injection 5 mg    [DISCONTINUED] ziprasidone injection 5 mg     Current Outpatient Medications on File Prior to Encounter   Medication Sig    acetaminophen (TYLENOL) 325 MG tablet Take 2 tablets (650 mg total) by mouth every 8 (eight) hours as needed for Temperature greater than (or equal to 101 degree F).    DULoxetine (CYMBALTA) 30 MG capsule Take 1 capsule (30 mg total) by mouth once daily.    finasteride (PROSCAR) 5 mg tablet Take 1 tablet (5 mg total) by mouth once daily.    folic acid (FOLVITE) 1 MG tablet Take 1 tablet (1 mg total) by mouth once daily.    furosemide (LASIX) 40 MG tablet Take 40 mg by mouth once daily.     lactulose (CHRONULAC) 20 gram/30 mL Soln Take 30 mLs (20 g total) by mouth 3 (three) times daily.    lactulose (CHRONULAC) 20 gram/30 mL Soln Take 30 mLs (20 g total) by mouth daily as needed. Extra dosing for increased confusion or constipation/reduced bowel movements less then 3x daily    multivitamin Tab Take 1 tablet by mouth once daily.    pantoprazole (PROTONIX) 40 MG tablet Take 1 tablet (40 mg total) by mouth once daily.    potassium chloride (KLOR-CON) 10 MEQ TbSR Take 10 mEq by mouth once daily.    propranoloL (INDERAL) 10 MG tablet Take 10 mg by mouth 2 (two) times daily.    rifAXIMin (XIFAXAN) 550 mg Tab Take 550 mg by mouth 2 (two) times daily.    spironolactone (ALDACTONE) 25 MG tablet Take 1 tablet (25 mg total) by mouth once daily.    thiamine 100 MG tablet Take 1 tablet (100 mg  total) by mouth once daily.    [DISCONTINUED] albuterol 90 mcg/actuation inhaler Inhale 2 puffs into the lungs every 6 (six) hours as needed for Wheezing. Rescue (Patient not taking: Reported on 5/11/2022)     Family History       Problem Relation (Age of Onset)    Heart disease Mother          Tobacco Use    Smoking status: Former     Packs/day: 2.00     Years: 28.00     Pack years: 56.00     Types: Cigarettes    Smokeless tobacco: Never    Tobacco comments:     quit about 30 years ago    Substance and Sexual Activity    Alcohol use: No     Comment: quit about 10 years ago    Drug use: No    Sexual activity: Not on file     Review of Systems   Unable to perform ROS: Mental status change   Constitutional:  Positive for appetite change.   Psychiatric/Behavioral:  Positive for confusion and decreased concentration.    Objective:     Vital Signs (Most Recent):  Temp: 98.9 °F (37.2 °C) (03/21/23 1551)  Pulse: 71 (03/21/23 2200)  Resp: 16 (03/21/23 2200)  BP: (!) 132/104 (03/21/23 2200)  SpO2: 98 % (03/21/23 2200)   Vital Signs (24h Range):  Temp:  [97.8 °F (36.6 °C)-98.9 °F (37.2 °C)] 98.9 °F (37.2 °C)  Pulse:  [65-98] 71  Resp:  [11-18] 16  SpO2:  [98 %-100 %] 98 %  BP: (102-146)/() 132/104     Weight: 63 kg (139 lb)  Body mass index is 27.15 kg/m².    Physical Exam  Constitutional:       Appearance: He is ill-appearing.   HENT:      Head: Normocephalic and atraumatic.      Mouth/Throat:      Mouth: Mucous membranes are dry.   Eyes:      Conjunctiva/sclera: Conjunctivae normal.   Cardiovascular:      Rate and Rhythm: Normal rate and regular rhythm.   Pulmonary:      Effort: Pulmonary effort is normal.      Breath sounds: No wheezing.   Abdominal:      Tenderness: There is no abdominal tenderness.   Musculoskeletal:         General: No swelling or deformity.      Cervical back: Neck supple.   Skin:     Coloration: Skin is not jaundiced.   Neurological:      General: No focal deficit present.      Mental  Status: He is alert. He is disoriented.           Significant Labs: All pertinent labs within the past 24 hours have been reviewed.  BMP:   Recent Labs   Lab 03/21/23  1636      *   K 3.7   CL 99   CO2 22*   BUN 18   CREATININE 2.2*   CALCIUM 9.2   MG 1.8     CBC:   Recent Labs   Lab 03/20/23  0731 03/21/23  1636   WBC 6.33 7.09   HGB 12.9* 14.1   HCT 36.5* 40.0   * 178     CMP:   Recent Labs   Lab 03/20/23  0731 03/21/23  1636   * 135*   K 3.2* 3.7   CL 99 99   CO2 26 22*   GLU 99 101   BUN 14 18   CREATININE 1.18 2.2*   CALCIUM 8.7 9.2   PROT 6.9 7.4   ALBUMIN 3.3* 3.4*   BILITOT 1.6* 1.7*   ALKPHOS 92 104   AST 42 38   ALT 27 24   ANIONGAP 10 14       Significant Imaging: I have reviewed all pertinent imaging results/findings within the past 24 hours.  I have reviewed and interpreted all pertinent imaging results/findings within the past 24 hours.    Imaging Results              CT Head Without Contrast (Final result)  Result time 03/21/23 18:42:02      Final result by Robyn Dubose MD (03/21/23 18:42:02)                   Impression:      No acute or adverse finding      Electronically signed by: Robyn Dubose  Date:    03/21/2023  Time:    18:42               Narrative:    EXAMINATION:  CT HEAD WITHOUT CONTRAST    CLINICAL HISTORY:  Mental status change, unknown cause;    TECHNIQUE:  Low dose axial images were obtained through the head.  Coronal and sagittal reformations were also performed. Contrast was not administered.    COMPARISON:  03/13/2023    FINDINGS:  Automated exposure technique utilized iterative technique 5    No acute intracranial hemorrhage or mass effect.  Chronic findings similar to prior.  Ventricle stable caliber.  No displaced calvarial fracture or adverse osseous finding                                      I have independently reviewed all pertinent labs within the past 24 hours.    I have independently reviewed, visualized and interpreted all pertinent  imaging results within the past 24 hours and discussed the findings with the ED physician, Dr. Casiano          Assessment/Plan:     * Acute hepatic encephalopathy  -ammonia level elevated 65.    -lactulose 10 g p.o. t.i.d..    -repeat ammonia level in a.m..  -continue rifaximin      Liver cirrhosis  -followed by Dr. Munoz in the hepatology clinic  -per GI recommendations from S , outpatient TIPS in the near future      Delirium  -Geodon as needed  -avoid benzodiazepines   -delirium precautions      Debility  -per discharge summary earlier today, family seeking long term NH placement.        VTE Risk Mitigation (From admission, onward)         Ordered     Place sequential compression device  Until discontinued         03/21/23 2059                   On 03/21/2023, patient should be placed in hospital observation services under my care.        Loy Olivo MD  Department of Hospital Medicine  [facility] - Emergency Dept.

## 2023-03-22 NOTE — ASSESSMENT & PLAN NOTE
-ammonia level elevated 65.    -lactulose 10 g p.o. t.i.d..    -repeat ammonia level in a.m..  -continue rifaximin    3/22/23  Ammonia higher today  Add lactulose enemas. Can discontinue once patient has bowel movements  Hepatology input appreciated  Continue management as above

## 2023-03-22 NOTE — PLAN OF CARE
Discussed poc with pt, pt verbalized understanding    Purposeful rounding every 2hours  Bilateral soft wrist restraints started at 1202; patient tolerated well and monitored Q2h with skin checks performed.  Lactulose enema given as ordered with good results noted. Incontinent of stool. Large BM noted. Skin prep applied sacral area and bilateral groin due to redness.     VS wnl  Cardiac monitoring in use, pt is NSR, tele monitor # [id number]  Fall precautions in place, remains injury free    Bed locked at lowest position  Call light within reach    Chart check complete  Will cont with POC

## 2023-03-23 LAB
ALBUMIN SERPL BCP-MCNC: 3 G/DL (ref 3.5–5.2)
ALP SERPL-CCNC: 93 U/L (ref 55–135)
ALT SERPL W/O P-5'-P-CCNC: 23 U/L (ref 10–44)
AMMONIA PLAS-SCNC: 68 UMOL/L (ref 10–50)
ANION GAP SERPL CALC-SCNC: 12 MMOL/L (ref 8–16)
AST SERPL-CCNC: 38 U/L (ref 10–40)
BILIRUB SERPL-MCNC: 1.7 MG/DL (ref 0.1–1)
BUN SERPL-MCNC: 22 MG/DL (ref 8–23)
CALCIUM SERPL-MCNC: 9 MG/DL (ref 8.7–10.5)
CHLORIDE SERPL-SCNC: 105 MMOL/L (ref 95–110)
CO2 SERPL-SCNC: 20 MMOL/L (ref 23–29)
CREAT SERPL-MCNC: 1.7 MG/DL (ref 0.5–1.4)
EST. GFR  (NO RACE VARIABLE): 42 ML/MIN/1.73 M^2
GLUCOSE SERPL-MCNC: 82 MG/DL (ref 70–110)
POCT GLUCOSE: 84 MG/DL (ref 70–110)
POTASSIUM SERPL-SCNC: 3.8 MMOL/L (ref 3.5–5.1)
PROT SERPL-MCNC: 6.4 G/DL (ref 6–8.4)
SODIUM SERPL-SCNC: 137 MMOL/L (ref 136–145)

## 2023-03-23 PROCEDURE — 25000003 PHARM REV CODE 250: Performed by: INTERNAL MEDICINE

## 2023-03-23 PROCEDURE — G0427 PR INPT TELEHEALTH CON 70/>M: ICD-10-PCS | Mod: 95,,, | Performed by: PSYCHIATRY & NEUROLOGY

## 2023-03-23 PROCEDURE — 86580 TB INTRADERMAL TEST: CPT | Performed by: NURSE PRACTITIONER

## 2023-03-23 PROCEDURE — 30200315 PPD INTRADERMAL TEST REV CODE 302: Performed by: NURSE PRACTITIONER

## 2023-03-23 PROCEDURE — 11000001 HC ACUTE MED/SURG PRIVATE ROOM

## 2023-03-23 PROCEDURE — 63600175 PHARM REV CODE 636 W HCPCS: Performed by: INTERNAL MEDICINE

## 2023-03-23 PROCEDURE — 36415 COLL VENOUS BLD VENIPUNCTURE: CPT | Performed by: INTERNAL MEDICINE

## 2023-03-23 PROCEDURE — G0427 INPT/ED TELECONSULT70: HCPCS | Mod: 95,,, | Performed by: PSYCHIATRY & NEUROLOGY

## 2023-03-23 PROCEDURE — 82140 ASSAY OF AMMONIA: CPT | Performed by: INTERNAL MEDICINE

## 2023-03-23 PROCEDURE — 80053 COMPREHEN METABOLIC PANEL: CPT | Performed by: INTERNAL MEDICINE

## 2023-03-23 RX ORDER — HALOPERIDOL 5 MG/ML
2 INJECTION INTRAMUSCULAR EVERY 8 HOURS PRN
Status: DISCONTINUED | OUTPATIENT
Start: 2023-03-23 | End: 2023-03-28

## 2023-03-23 RX ADMIN — THIAMINE HCL TAB 100 MG 100 MG: 100 TAB at 12:03

## 2023-03-23 RX ADMIN — TUBERCULIN PURIFIED PROTEIN DERIVATIVE 5 UNITS: 5 INJECTION, SOLUTION INTRADERMAL at 07:03

## 2023-03-23 RX ADMIN — PANTOPRAZOLE SODIUM 40 MG: 40 TABLET, DELAYED RELEASE ORAL at 12:03

## 2023-03-23 RX ADMIN — PROPRANOLOL HYDROCHLORIDE 10 MG: 10 TABLET ORAL at 12:03

## 2023-03-23 RX ADMIN — FOLIC ACID 1 MG: 1 TABLET ORAL at 12:03

## 2023-03-23 RX ADMIN — RIFAXIMIN 550 MG: 550 TABLET ORAL at 12:03

## 2023-03-23 RX ADMIN — ZIPRASIDONE MESYLATE 5 MG: 20 INJECTION, POWDER, LYOPHILIZED, FOR SOLUTION INTRAMUSCULAR at 01:03

## 2023-03-23 RX ADMIN — FINASTERIDE 5 MG: 5 TABLET, FILM COATED ORAL at 12:03

## 2023-03-23 RX ADMIN — LACTULOSE 20 G: 20 SOLUTION ORAL at 03:03

## 2023-03-23 RX ADMIN — RIFAXIMIN 550 MG: 550 TABLET ORAL at 10:03

## 2023-03-23 RX ADMIN — LACTULOSE 20 G: 20 SOLUTION ORAL at 09:03

## 2023-03-23 RX ADMIN — LACTULOSE 20 G: 20 SOLUTION ORAL at 10:03

## 2023-03-23 NOTE — ASSESSMENT & PLAN NOTE
-per discharge summary earlier today, family seeking [facility] NH placement.    3/23/23  He has been accepted to [facility] [place] once medically stable  He will need TB and COVID tests prior to discharge (TB order placed)

## 2023-03-23 NOTE — PLAN OF CARE
Pt remains free of injury throughout the shift, safety measures remain in place.   Poc reviewed with pt. Vss, no acute s/s of distress. Medication give as ordered. PRN medication given for agitation. RAZA soft restraints remain in place,pt tolerated well, monitored Q2 hr with skin checks. Hourly rounding done. Chart reviwed, will cont with poc.       Problem: Adult Inpatient Plan of Care  Goal: Optimal Comfort and Wellbeing  Outcome: Ongoing, Progressing     Problem: Infection (Pneumonia)  Goal: Resolution of Infection Signs and Symptoms  Outcome: Ongoing, Progressing     Problem: Restraint, Nonbehavioral (Nonviolent)  Goal: Absence of Harm or Injury  Outcome: Ongoing, Progressing     Problem: Skin Injury Risk Increased  Goal: Skin Health and Integrity  Outcome: Ongoing, Progressing     Problem: Fall Injury Risk  Goal: Absence of Fall and Fall-Related Injury  Outcome: Ongoing, Progressing

## 2023-03-23 NOTE — CONSULTS
"  Consults  Teleconsult Time Documentation    Inpatient Telepsychiatry Consult    The chief complaint leading to psychiatric consultation is: possible delirium  This consultation is from the patient's treating NP Jeffrey Pham  Start time of consultation 8:30 am    Patient Identification:  Regino Cowan is a 72 y.o. male.    Patient information was obtained from patient.    History of Present Illness:    From hospital medicine note from yesterday:  "Mr. Cowan is a 72-year-old  male with PMH significant for liver cirrhosis, was hospitalized at [facility] for hepatic encephalopathy, and discharged earlier this afternoon.  Patient was treated with lactulose, rifaximin.  Developed delirium during this hospitalization, required treatment with Haldol, Geodon per Psychiatry.  Family wanted to pursue care home nursing home placement, process was initiated.  However patient had hepatology appointment today 3/21/23 with Dr. Munoz, hence was discharged from [facility].  Dr. Munoz evaluated patient in the clinic, and was sent to [facility] ED for admission for AMS and generalized deconditioning.  Ammonia level elevated 65.  Received lactulose in the ED. No family at the bedside.   Overview/Hospital Course:  Regino Johnson is a 72 year old male who was admitted to [facility] for hepatic encephalopathy. Patient presented to ED with AMS in setting of elevated ammonia. CT Head negative. Administration of PO lactulose difficult due to patient's agitation so hepatology recommended lactulose enemas.  Interval History: patient has not had a BM since admit. Added lactulose.  Review of Systems   Unable to perform ROS: Mental status change"    On interview by me today:  Pt. Arousable by RN, however does not respond to my questions. Says, that he wants to get some sleep.    Daughter Eleanor [phone]: last saw pt. 2 days ago; has been confused, e.g. said the hospital was not a real " [facility]; no recent alcohol/drug; had been living alone at home, left stove; most recently was coherent about a month ago; most recently worked a few months ago;    Juventino Mendes [phone]    Psychiatric History:   Please see previous psychiatric notes, most recent from 03/18/23.    Past Medical History:   Past Medical History:   Diagnosis Date    Hypertension     Liver cirrhosis      Allergies: Review of patient's allergies indicates:  No Known Allergies    Medications:  Scheduled Meds:    finasteride  5 mg Oral Daily    folic acid  1 mg Oral Daily    lactulose  200 g Rectal BID    lactulose  20 g Oral TID    pantoprazole  40 mg Oral Daily    propranoloL  10 mg Oral BID    rifAXIMin  550 mg Oral BID    thiamine  100 mg Oral Daily      PRN Meds: aluminum-magnesium hydroxide-simethicone, guaiFENesin 100 mg/5 ml, ziprasidone   Psychotherapeutics (From admission, onward)      Start     Stop Route Frequency Ordered    03/22/23 1303  ziprasidone injection 5 mg         -- IM Every 6 hours PRN 03/22/23 1204          Family History:   Family History   Problem Relation Age of Onset    Heart disease Mother      Current Evaluation:     Constitutional  Vitals:  Vitals:    03/22/23 0530 03/22/23 0600 03/22/23 0730 03/22/23 0815   BP: (!) 160/72 116/70 135/65 132/68   Pulse: 60 67 (!) 58 62   Resp: 13 16 16 18   Temp:   98 °F (36.7 °C) 98 °F (36.7 °C)   TempSrc:   Oral Oral   SpO2: 99% 98% 100% 100%   Weight:       Height:        03/22/23 0836 03/22/23 1100 03/22/23 1915 03/22/23 1924   BP: (!) 156/72   133/69   Pulse: 60 70 62 99   Resp: 18   18   Temp: 97.5 °F (36.4 °C)   97.8 °F (36.6 °C)   TempSrc: Oral   Axillary   SpO2: 100%   (!) 94%   Weight: 57.5 kg (126 lb 12.2 oz)      Height: 5' 4" (1.626 m)       03/22/23 2034 03/22/23 2130 03/22/23 2330 03/23/23 0320   BP:       Pulse: 100 62 62 64   Resp: 20      Temp:       TempSrc:       SpO2: 95%      Weight:       Height:        03/23/23 0520 03/23/23 0545 03/23/23 0734   BP:  " 129/66 (!) 120/59   Pulse: 64 (!) 141 61   Resp:  17 18   Temp:  97.6 °F (36.4 °C) 97.1 °F (36.2 °C)   TempSrc:   Axillary   SpO2:  (!) 92% 95%   Weight:      Height:         General:   Appears stated age     Psychiatric  Level of Consciousness: drowsy  Orientation: Pt. Arousable by RN, however does not respond to my questions. Says, that he wants to get some sleep.  Psychomotor Behavior: calm  Speech: slurred  Language: normal use of words  Attention: closes eyes when asked questions    Laboratory Data:   Recent Results (from the past 36 hour(s))   Comprehensive metabolic panel    Collection Time: 03/22/23 12:37 PM   Result Value Ref Range    Sodium 137 136 - 145 mmol/L    Potassium 3.5 3.5 - 5.1 mmol/L    Chloride 101 95 - 110 mmol/L    CO2 22 (L) 23 - 29 mmol/L    Glucose 104 70 - 110 mg/dL    BUN 21 8 - 23 mg/dL    Creatinine 2.1 (H) 0.5 - 1.4 mg/dL    Calcium 9.1 8.7 - 10.5 mg/dL    Total Protein 6.7 6.0 - 8.4 g/dL    Albumin 3.2 (L) 3.5 - 5.2 g/dL    Total Bilirubin 1.6 (H) 0.1 - 1.0 mg/dL    Alkaline Phosphatase 101 55 - 135 U/L    AST 34 10 - 40 U/L    ALT 23 10 - 44 U/L    Anion Gap 14 8 - 16 mmol/L    eGFR 33 (A) >60 mL/min/1.73 m^2   Magnesium    Collection Time: 03/22/23 12:37 PM   Result Value Ref Range    Magnesium 1.8 1.6 - 2.6 mg/dL   Ammonia    Collection Time: 03/22/23 12:38 PM   Result Value Ref Range    Ammonia 119 (H) 10 - 50 umol/L   CBC auto differential    Collection Time: 03/22/23 12:38 PM   Result Value Ref Range    WBC 6.30 3.90 - 12.70 K/uL    RBC 4.16 (L) 4.60 - 6.20 M/uL    Hemoglobin 13.1 (L) 14.0 - 18.0 g/dL    Hematocrit 36.9 (L) 40.0 - 54.0 %    MCV 89 82 - 98 fL    MCH 31.5 (H) 27.0 - 31.0 pg    MCHC 35.5 32.0 - 36.0 g/dL    RDW 14.2 11.5 - 14.5 %    Platelets 164 150 - 450 K/uL    MPV 10.7 9.2 - 12.9 fL    Immature Granulocytes 0.3 0.0 - 0.5 %    Gran # (ANC) 3.7 1.8 - 7.7 K/uL    Immature Grans (Abs) 0.02 0.00 - 0.04 K/uL    Lymph # 1.8 1.0 - 4.8 K/uL    Mono # 0.6 0.3 - 1.0 K/uL     Eos # 0.2 0.0 - 0.5 K/uL    Baso # 0.04 0.00 - 0.20 K/uL    nRBC 0 0 /100 WBC    Gran % 58.3 38.0 - 73.0 %    Lymph % 27.8 18.0 - 48.0 %    Mono % 10.0 4.0 - 15.0 %    Eosinophil % 3.0 0.0 - 8.0 %    Basophil % 0.6 0.0 - 1.9 %    Differential Method Automated    POCT glucose    Collection Time: 03/22/23  8:17 PM   Result Value Ref Range    POCT Glucose 95 70 - 110 mg/dL   POCT glucose    Collection Time: 03/23/23  5:44 AM   Result Value Ref Range    POCT Glucose 84 70 - 110 mg/dL        Assessment - Diagnosis - Goals:     Impression:   Altered mental state, possible delirium  Agitation  On 03/22/23 serum ammonia 119, Creatinine 2.1  QTc on EKG from 03/21/23 475    Case d/w CAMILLA Pham.    Recommendations:   - please have sitter or telesitter monitor pt. At all times  - pt. Does not have capacity to refuse necessary medical care at this time  - no standing psychotropic medication at this time  - Haldol 2 mg PO/IM Q8H PRN for agitation  - follow EKG/QTc if pt. Receives Haldol  - obtain telepsych f/u prn    Total time, including chart review, time with patient, obtaining collateral info[if possible]: 55 min

## 2023-03-23 NOTE — PLAN OF CARE
Patient's restraint order  at 1202 and provider was notified and no new order received. Patient is calm and stable at this time.

## 2023-03-23 NOTE — PROGRESS NOTES
[facility] Medicine  Progress Note    Patient Name: Regino Cowan  MRN: [id number]  Patient Class: IP- Inpatient   Admission Date: 3/21/2023  Length of Stay: 1 days  Attending Physician: Linn Montoya DO  Primary Care Provider: Rod Norman MD        Subjective:     Principal Problem:Acute hepatic encephalopathy        HPI:  Mr. Cowan is a 72-year-old  male with PMH significant for liver cirrhosis, was hospitalized at [facility] for hepatic encephalopathy, and discharged earlier this afternoon.  Patient was treated with lactulose, rifaximin.  Developed delirium during this hospitalization, required treatment with Haldol, Geodon per Psychiatry.  Family wanted to pursue shelter nursing home placement, process was initiated.  However patient had hepatology appointment today 3/21/23 with Dr. Munoz, hence was discharged from [facility].  Dr. Munoz evaluated patient in the clinic, and was sent to [facility] ED for admission for AMS and generalized deconditioning.  Ammonia level elevated 65.  Received lactulose in the ED. No family at the bedside.       Overview/Hospital Course:  Regino Johnson is a 72 year old male who was admitted to [facility] for hepatic encephalopathy. Patient presented to ED with AMS in setting of elevated ammonia. CT Head negative. Administration of PO lactulose difficult due to patient's agitation so hepatology recommended lactulose enemas.    03/23/23  Telepsych evaluated as patient had behavioral issues yesterday, recommended sitter or tele sitter at all times, he has no capacity to refuse necessary medical care at this time, Haldol PRN for agitation, and EKG if he receives Haldol. Agitation may have been from increased ammonia level as he had not had a BM. Overnight patient had several BMs post lactulose enemas. More alert and responsive today. Calm and cooperative. Ammonia level 68 from 119.  He has been accepted to  [facility] for NH placement once medically stable.       Interval History: Patient seen and examined. Only oriented to person at this time. Calm and cooperative. No behavioral issues a this time.    Review of Systems   Unable to perform ROS: Mental status change   Objective:     Vital Signs (Most Recent):  Temp: 98.1 °F (36.7 °C) (03/23/23 1134)  Pulse: 60 (03/23/23 1533)  Resp: 16 (03/23/23 1134)  BP: (!) 145/70 (03/23/23 1134)  SpO2: 100 % (03/23/23 1134)   Vital Signs (24h Range):  Temp:  [97.1 °F (36.2 °C)-98.1 °F (36.7 °C)] 98.1 °F (36.7 °C)  Pulse:  [] 60  Resp:  [16-20] 16  SpO2:  [92 %-100 %] 100 %  BP: (120-145)/(59-70) 145/70     Weight: 57.5 kg (126 lb 12.2 oz)  Body mass index is 21.76 kg/m².  No intake or output data in the 24 hours ending 03/23/23 1639   Physical Exam  Constitutional:       Appearance: He is ill-appearing.   HENT:      Head: Normocephalic and atraumatic.      Mouth/Throat:      Mouth: Mucous membranes are dry.   Eyes:      Conjunctiva/sclera: Conjunctivae normal.   Cardiovascular:      Rate and Rhythm: Normal rate and regular rhythm.   Pulmonary:      Effort: Pulmonary effort is normal.      Breath sounds: No wheezing.   Abdominal:      Tenderness: There is no abdominal tenderness.   Musculoskeletal:         General: No swelling or deformity.      Cervical back: Neck supple.   Skin:     Coloration: Skin is not jaundiced.      Findings: Bruising (LUE) present.      Comments: Bilateral wrist restraints     Neurological:      General: No focal deficit present.      Mental Status: He is alert.      Comments: Oriented to person only   Psychiatric:         Behavior: Behavior is withdrawn. Behavior is not agitated. Behavior is cooperative.      Comments: Irritable mood. Poor insight       Significant Labs: All pertinent labs within the past 24 hours have been reviewed.  CMP:   Recent Labs   Lab 03/22/23  1237 03/23/23  0828    137   K 3.5 3.8    105    CO2 22* 20*    82   BUN 21 22   CREATININE 2.1* 1.7*   CALCIUM 9.1 9.0   PROT 6.7 6.4   ALBUMIN 3.2* 3.0*   BILITOT 1.6* 1.7*   ALKPHOS 101 93   AST 34 38   ALT 23 23   ANIONGAP 14 12       Significant Imaging: I have reviewed all pertinent imaging results/findings within the past 24 hours.      Assessment/Plan:      * Acute hepatic encephalopathy  -ammonia level elevated 65.    -lactulose 10 g p.o. t.i.d..    -repeat ammonia level in a.m..  -continue rifaximin    3/22/23  Ammonia higher today  Add lactulose enemas. Can discontinue once patient has bowel movements  Hepatology input appreciated  Continue management as above    3/23/23  Ammonia level improved to 68 post lactulose enemas  Continue Rifaximin  Monitor      Delirium  -Geodon as needed  -avoid benzodiazepines   -delirium precautions    Patient was seen by psychiatry, Dr. Figueredo, on 3/15/23 who recommended continuing duloxetine 30mg BID and depakote 250 mg PO BID for delirium.   -will consult Telepsych for direction  -currently on ziprasidone IM prn    3/3/23  Telepsych evaluated, Dr. Greene, and recommended sitter or tele sitter at all times, he does not have the capacity to refuse necessary medical care at this time, no standing psychotropic meds at this time, Haldol PRN, and EKG/Qtc if he receives Haldol.    Debility  -per discharge summary earlier today, family seeking longterm NH placement.    3/23/23  He has been accepted to [facility] once medically stable  He will need TB and COVID tests prior to discharge (TB order placed)    Liver cirrhosis  -followed by Dr. Munoz in the hepatology clinic  -per GI recommendations from [facility], outpatient TIPS in the near future        VTE Risk Mitigation (From admission, onward)         Ordered     Place sequential compression device  Until discontinued         03/21/23 2059                Discharge Planning   CB:      Code Status: Full Code   Is the patient medically ready for  discharge?:     Reason for patient still in hospital (select all that apply): Patient trending condition  Discharge Plan A: New Nursing Home placement - nursing home care facility                  TRACI Montgomery  Department of Hospital Medicine   [facility] S[facility]

## 2023-03-23 NOTE — ASSESSMENT & PLAN NOTE
-Geodon as needed  -avoid benzodiazepines   -delirium precautions    Patient was seen by psychiatry, Dr. Figueredo, on 3/15/23 who recommended continuing duloxetine 30mg BID and depakote 250 mg PO BID for delirium.   -will consult Telepsych for direction  -currently on ziprasidone IM prn    3/3/23  Telepsych evaluated, Dr. Greene, and recommended sitter or tele sitter at all times, he does not have the capacity to refuse necessary medical care at this time, no standing psychotropic meds at this time, Haldol PRN, and EKG/Qtc if he receives Haldol.

## 2023-03-23 NOTE — PLAN OF CARE
Patient remains injury free.  No signs or symptoms of distress noted.  Patient denies any pain or discomfort at this time. Patient no longer in restraints and continues to be calm at this time Patient only oriented to self and has to be re-oriented frequently. All active orders reviewed and adhered to. 12 hour Chart check complete.  Continue with current poc.

## 2023-03-23 NOTE — SUBJECTIVE & OBJECTIVE
Interval History: Patient seen and examined. Only oriented to person at this time. Calm and cooperative. No behavioral issues a this time.    Review of Systems   Unable to perform ROS: Mental status change   Objective:     Vital Signs (Most Recent):  Temp: 98.1 °F (36.7 °C) (03/23/23 1134)  Pulse: 60 (03/23/23 1533)  Resp: 16 (03/23/23 1134)  BP: (!) 145/70 (03/23/23 1134)  SpO2: 100 % (03/23/23 1134)   Vital Signs (24h Range):  Temp:  [97.1 °F (36.2 °C)-98.1 °F (36.7 °C)] 98.1 °F (36.7 °C)  Pulse:  [] 60  Resp:  [16-20] 16  SpO2:  [92 %-100 %] 100 %  BP: (120-145)/(59-70) 145/70     Weight: 57.5 kg (126 lb 12.2 oz)  Body mass index is 21.76 kg/m².  No intake or output data in the 24 hours ending 03/23/23 1639   Physical Exam  Constitutional:       Appearance: He is ill-appearing.   HENT:      Head: Normocephalic and atraumatic.      Mouth/Throat:      Mouth: Mucous membranes are dry.   Eyes:      Conjunctiva/sclera: Conjunctivae normal.   Cardiovascular:      Rate and Rhythm: Normal rate and regular rhythm.   Pulmonary:      Effort: Pulmonary effort is normal.      Breath sounds: No wheezing.   Abdominal:      Tenderness: There is no abdominal tenderness.   Musculoskeletal:         General: No swelling or deformity.      Cervical back: Neck supple.   Skin:     Coloration: Skin is not jaundiced.      Findings: Bruising (LUE) present.      Comments: Bilateral wrist restraints     Neurological:      General: No focal deficit present.      Mental Status: He is alert.      Comments: Oriented to person only   Psychiatric:         Behavior: Behavior is withdrawn. Behavior is not agitated. Behavior is cooperative.      Comments: Irritable mood. Poor insight       Significant Labs: All pertinent labs within the past 24 hours have been reviewed.  CMP:   Recent Labs   Lab 03/22/23  1237 03/23/23  0828    137   K 3.5 3.8    105   CO2 22* 20*    82   BUN 21 22   CREATININE 2.1* 1.7*   CALCIUM 9.1 9.0    PROT 6.7 6.4   ALBUMIN 3.2* 3.0*   BILITOT 1.6* 1.7*   ALKPHOS 101 93   AST 34 38   ALT 23 23   ANIONGAP 14 12       Significant Imaging: I have reviewed all pertinent imaging results/findings within the past 24 hours.

## 2023-03-23 NOTE — PLAN OF CARE
03/23/23 1117   Post-Acute Status   Post-Acute Authorization Placement   Post-Acute Placement Status Pending medical clearance/testing   Discharge Plan   Discharge Plan A New Nursing Home placement - longterm care facility     SW spoke to pt's daughter is in agreement with pt going to [facility]. Pt's daughter will get with the admission's coordinator Faustino at [facility] regarding needed documents. Pending tb skin test and covid order.

## 2023-03-23 NOTE — ASSESSMENT & PLAN NOTE
-ammonia level elevated 65.    -lactulose 10 g p.o. t.i.d..    -repeat ammonia level in a.m..  -continue rifaximin    3/22/23  Ammonia higher today  Add lactulose enemas. Can discontinue once patient has bowel movements  Hepatology input appreciated  Continue management as above    3/23/23  Ammonia level improved to 68 post lactulose enemas  Continue Rifaximin  Monitor

## 2023-03-24 ENCOUNTER — TELEPHONE (OUTPATIENT)
Dept: HEPATOLOGY | Facility: CLINIC | Age: 73
End: 2023-03-24
Payer: MEDICARE

## 2023-03-24 PROCEDURE — 25000003 PHARM REV CODE 250: Performed by: INTERNAL MEDICINE

## 2023-03-24 PROCEDURE — 25000003 PHARM REV CODE 250: Performed by: NURSE PRACTITIONER

## 2023-03-24 PROCEDURE — 63600175 PHARM REV CODE 636 W HCPCS: Performed by: NURSE PRACTITIONER

## 2023-03-24 PROCEDURE — 11000001 HC ACUTE MED/SURG PRIVATE ROOM

## 2023-03-24 RX ORDER — LACTULOSE 10 G/15ML
20 SOLUTION ORAL 4 TIMES DAILY
Status: DISCONTINUED | OUTPATIENT
Start: 2023-03-24 | End: 2023-03-27

## 2023-03-24 RX ADMIN — LACTULOSE 20 G: 20 SOLUTION ORAL at 03:03

## 2023-03-24 RX ADMIN — LACTULOSE 20 G: 20 SOLUTION ORAL at 08:03

## 2023-03-24 RX ADMIN — LACTULOSE 20 G: 20 SOLUTION ORAL at 09:03

## 2023-03-24 RX ADMIN — HALOPERIDOL LACTATE 2 MG: 5 INJECTION, SOLUTION INTRAMUSCULAR at 03:03

## 2023-03-24 RX ADMIN — PROPRANOLOL HYDROCHLORIDE 10 MG: 10 TABLET ORAL at 09:03

## 2023-03-24 RX ADMIN — RIFAXIMIN 550 MG: 550 TABLET ORAL at 09:03

## 2023-03-24 NOTE — ASSESSMENT & PLAN NOTE
-ammonia level elevated 65.    -lactulose 10 g p.o. t.i.d..    -repeat ammonia level in a.m..  -continue rifaximin    3/22/23  Ammonia higher today  Add lactulose enemas. Can discontinue once patient has bowel movements  Hepatology input appreciated  Continue management as above    3/23/23  Ammonia level improved to 68 post lactulose enemas  Continue Rifaximin  Monitor    3/24/22  Unable to get labs today  Continue lactulose enemas and Rifaximin

## 2023-03-24 NOTE — NURSING
Patient became combative, verbally abusive and trying to get out of bed.  Provider ordered for patient to be put back in soft wrist restraints.

## 2023-03-24 NOTE — ASSESSMENT & PLAN NOTE
-Geodon as needed  -avoid benzodiazepines   -delirium precautions    Patient was seen by psychiatry, Dr. Figueredo, on 3/15/23 who recommended continuing duloxetine 30mg BID and depakote 250 mg PO BID for delirium.   -will consult Telepsych for direction  -currently on ziprasidone IM prn    3/3/23  Telepsych evaluated, Dr. Greene, and recommended sitter or tele sitter at all times, he does not have the capacity to refuse necessary medical care at this time, no standing psychotropic meds at this time, Haldol PRN, and EKG/Qtc if he receives Haldol.    2/24/23  Avoid sedatives  Continue Haldol prn

## 2023-03-24 NOTE — PROGRESS NOTES
[facility] Medicine  Progress Note    Patient Name: Regino Cowan  MRN: [id number]  Patient Class: IP- Inpatient   Admission Date: 3/21/2023  Length of Stay: 2 days  Attending Physician: Linn Montoya DO  Primary Care Provider: Rod Norman MD    Subjective:     Principal Problem:Acute hepatic encephalopathy        HPI:  Mr. Cowan is a 72-year-old  male with PMH significant for liver cirrhosis, was hospitalized at [facility] for hepatic encephalopathy, and discharged earlier this afternoon.  Patient was treated with lactulose, rifaximin.  Developed delirium during this hospitalization, required treatment with Haldol, Geodon per Psychiatry.  Family wanted to pursue intermediate nursing home placement, process was initiated.  However patient had hepatology appointment today 3/21/23 with Dr. Munoz, hence was discharged from [facility].  Dr. Munoz evaluated patient in the clinic, and was sent to [facility] ED for admission for AMS and generalized deconditioning.  Ammonia level elevated 65.  Received lactulose in the ED. No family at the bedside.       Overview/Hospital Course:  Regino Johnson is a 72 year old male who was admitted to [facility] for hepatic encephalopathy. Patient presented to ED with AMS in setting of elevated ammonia. CT Head negative. Administration of PO lactulose difficult due to patient's agitation so hepatology recommended lactulose enemas.    03/23/23  Telepsych evaluated as patient had behavioral issues yesterday, recommended sitter or tele sitter at all times, he has no capacity to refuse necessary medical care at this time, Haldol PRN for agitation, and EKG if he receives Haldol. Agitation may have been from increased ammonia level as he had not had a BM. Overnight patient had several BMs post lactulose enemas. More alert and responsive today. Calm and cooperative. Ammonia level 68 from 119.  He has been accepted to  [facility] for NH placement once medically stable.     3/24/23  Anticipate discharge to [facility] on Monday. Continue laxatives for hepatic encephalopathy.       Interval History: irritable when stimulated. Not cooperative and combative this afternoon warranting wrist restraints.    Review of Systems   Unable to perform ROS: Mental status change   Objective:     Vital Signs (Most Recent):  Temp: 98 °F (36.7 °C) (03/24/23 0707)  Pulse: 62 (03/24/23 1333)  Resp: 18 (03/24/23 0707)  BP: (!) 111/59 (03/24/23 0707)  SpO2: 98 % (03/24/23 0707)   Vital Signs (24h Range):  Temp:  [98 °F (36.7 °C)-98.4 °F (36.9 °C)] 98 °F (36.7 °C)  Pulse:  [60-88] 62  Resp:  [16-19] 18  SpO2:  [96 %-98 %] 98 %  BP: (109-111)/(56-59) 111/59     Weight: 57.5 kg (126 lb 12.2 oz)  Body mass index is 21.76 kg/m².  No intake or output data in the 24 hours ending 03/24/23 1456   Physical Exam  Constitutional:       Appearance: He is ill-appearing.   HENT:      Head: Normocephalic and atraumatic.      Mouth/Throat:      Mouth: Mucous membranes are dry.   Eyes:      Conjunctiva/sclera: Conjunctivae normal.   Cardiovascular:      Rate and Rhythm: Normal rate and regular rhythm.   Pulmonary:      Effort: Pulmonary effort is normal.      Breath sounds: No wheezing.   Abdominal:      Tenderness: There is no abdominal tenderness.   Musculoskeletal:         General: No swelling or deformity.      Cervical back: Neck supple.   Skin:     Coloration: Skin is not jaundiced.      Findings: Bruising (LUE) present.      Comments: Bilateral wrist restraints     Neurological:      General: No focal deficit present.      Mental Status: He is alert.      Comments: Oriented to person only   Psychiatric:         Behavior: Behavior is agitated (when stimulated) and withdrawn. Behavior is cooperative.      Comments: Irritable mood. Poor insight       Significant Labs: All pertinent labs within the past 24 hours have been  reviewed.    CMP:   Recent Labs   Lab 03/23/23  0828      K 3.8      CO2 20*   GLU 82   BUN 22   CREATININE 1.7*   CALCIUM 9.0   PROT 6.4   ALBUMIN 3.0*   BILITOT 1.7*   ALKPHOS 93   AST 38   ALT 23   ANIONGAP 12       Significant Imaging: I have reviewed all pertinent imaging results/findings within the past 24 hours.      Assessment/Plan:      * Acute hepatic encephalopathy  -ammonia level elevated 65.    -lactulose 10 g p.o. t.i.d..    -repeat ammonia level in a.m..  -continue rifaximin    3/22/23  Ammonia higher today  Add lactulose enemas. Can discontinue once patient has bowel movements  Hepatology input appreciated  Continue management as above    3/23/23  Ammonia level improved to 68 post lactulose enemas  Continue Rifaximin  Monitor    3/24/22  Unable to get labs today  Continue lactulose enemas and Rifaximin      Delirium  -Geodon as needed  -avoid benzodiazepines   -delirium precautions    Patient was seen by psychiatry, Dr. Figueredo, on 3/15/23 who recommended continuing duloxetine 30mg BID and depakote 250 mg PO BID for delirium.   -will consult Telepsych for direction  -currently on ziprasidone IM prn    3/3/23  Telepsych evaluated, Dr. Greene, and recommended sitter or tele sitter at all times, he does not have the capacity to refuse necessary medical care at this time, no standing psychotropic meds at this time, Haldol PRN, and EKG/Qtc if he receives Haldol.    2/24/23  Avoid sedatives  Continue Haldol prn    Debility  -per discharge summary earlier today, family seeking longterm NH placement.    3/23/23  He has been accepted to [facility]] once medically stable  He will need TB and COVID tests prior to discharge (TB order placed)    Liver cirrhosis  -followed by Dr. Munoz in the hepatology clinic  -per GI recommendations from S , outpatient TIPS in the near future        VTE Risk Mitigation (From admission, onward)         Ordered     Place sequential compression  device  Until discontinued         03/21/23 2059                Discharge Planning   CB:      Code Status: Full Code   Is the patient medically ready for discharge?:     Reason for patient still in hospital (select all that apply): Treatment  Discharge Plan A: New Nursing Home placement - [facility] care facility            Jeffrey Pham NP  Department of Hospital Medicine   [facility] - Med Surg

## 2023-03-24 NOTE — PLAN OF CARE
Discharge plan discussed with Dr Montoya, anticipate discharge Monday.  [facility] ([place] [phone]) updated.  Daughter, [phone], updated as well.

## 2023-03-24 NOTE — NURSING
"Pt. Was trying to get up out of the bed and setting off of the bed alarm and the avasys. I went into the pt. Room to try and get the pt. Back in the bed and turn off the bed alarm. As I was talking to the pt. The pt. Started to curse me, and I informed him that I do not want to be talked that way and that he also should not talk to my staff that way. Pt. Then proceeds to say that he remembers me from the night before which I stated yes I was here last night trying to make sure that he was safe, and that is when the pt. Then stated no I was not doing that I was "sucking his keysha last night" then calls his daughter and leaves a voicemail on her phone stating that he is being rapped while in the hospital. Pt. Is still confused and is also accusing staff of rapping him and giving him drugs to help with rapping him.    "

## 2023-03-24 NOTE — PLAN OF CARE
AAO1. Avesis on @ bedside. Pt remained free from fall and injury. Cardiac monitoring. No sign of any distress noted. Safety precautions alert. Pt asked to call for assistance if needed. No Iv access MD notified.  Chart checked reviewed. VSS. Plan of care continued.

## 2023-03-24 NOTE — CONSULTS
[facility] Surg  Gastroenterology  Consult Note    Patient Name: Regino Cowan  MRN: [id number]  Admission Date: 3/21/2023  Hospital Length of Stay: 2 days  Code Status: Full Code   Attending Provider: Linn Montoya DO   Consulting Provider: Zheng Mcclain MD  Primary Care Physician: Rod Norman MD  Principal Problem:Acute hepatic encephalopathy    Consults  Subjective:     HPI: Mr. Cowan is a 72-year-old  male with PMH of  liver cirrhosis, was admitted with hepatic encephalopathy. Patient was seen on 3/23 by  Dr. Munoz  in the clinic, and was sent to [facility] ED for admission for AMS and generalized deconditioning. CT Head negative. Overnight patient had several BMs post lactulose enemas      Family wanted to pursue [facility] nursing home placement,      Past Medical History:   Diagnosis Date    Hypertension     Liver cirrhosis        History reviewed. No pertinent surgical history.    Review of patient's allergies indicates:  No Known Allergies  Family History       Problem Relation (Age of Onset)    Heart disease Mother          Tobacco Use    Smoking status: Former     Packs/day: 2.00     Years: 28.00     Pack years: 56.00     Types: Cigarettes    Smokeless tobacco: Never    Tobacco comments:     quit about 30 years ago    Substance and Sexual Activity    Alcohol use: No     Comment: quit about 10 years ago    Drug use: No    Sexual activity: Not on file     Review of Systems  Objective:     Vital Signs (Most Recent):  Temp: 98 °F (36.7 °C) (03/24/23 0707)  Pulse: 62 (03/24/23 1333)  Resp: 18 (03/24/23 0707)  BP: (!) 111/59 (03/24/23 0707)  SpO2: 98 % (03/24/23 0707)   Vital Signs (24h Range):  Temp:  [98 °F (36.7 °C)-98.4 °F (36.9 °C)] 98 °F (36.7 °C)  Pulse:  [60-88] 62  Resp:  [16-19] 18  SpO2:  [96 %-98 %] 98 %  BP: (109-111)/(56-59) 111/59     Weight: 57.5 kg (126 lb 12.2 oz) (03/22/23 0836)  Body mass index is 21.76 kg/m².    No intake or output data in the 24  hours ending 03/24/23 1430    Lines/Drains/Airways       None                   Physical Exam    Significant Labs:  CBC: No results for input(s): WBC, HGB, HCT, PLT in the last 48 hours.  CMP:   Recent Labs   Lab 03/23/23  0828   GLU 82   CALCIUM 9.0   ALBUMIN 3.0*   PROT 6.4      K 3.8   CO2 20*      BUN 22   CREATININE 1.7*   ALKPHOS 93   ALT 23   AST 38   BILITOT 1.7*     Liver Function Test:   Recent Labs   Lab 03/23/23  0828   ALT 23   AST 38   ALKPHOS 93   BILITOT 1.7*   PROT 6.4   ALBUMIN 3.0*       Significant Imaging:  NA    Assessment/Plan:     Active Diagnoses:    Diagnosis Date Noted POA    PRINCIPAL PROBLEM:  Acute hepatic encephalopathy [K76.82] 02/05/2023 Yes    Delirium [R41.0] 03/16/2023 Yes    Debility [R53.81] 02/19/2023 Yes    Liver cirrhosis [K74.60]  Yes      Problems Resolved During this Admission:     Hepatic encephalopathy  Continue lactulose and rifaximin    Debility  family seeking [facility] NH placement      Cirrhosis  MELD-Na score: 15 at 2/19/2023  4:54 AM  MELD score: 12 at 2/19/2023  4:54 AM  Calculated from:  Serum Creatinine: 1.16 mg/dL at 2/19/2023  4:54 AM  Serum Sodium: 134 mmol/L at 2/19/2023  4:54 AM  Total Bilirubin: 1.4 mg/dL at 2/18/2023  5:07 AM  INR(ratio): 1.3 at 2/17/2023 10:03 AM  Age: 72 years     Not a transplant candidate     Delirium  -Per Primary team  Avoid sedatives      He has been accepted to [facility] for NH placement once medically stable.         Thank you for your consult. I will follow-up with patient. Please contact us if you have any additional questions.    Zheng Mcclain MD  Gastroenterology  [facility] - Med Surg

## 2023-03-24 NOTE — SUBJECTIVE & OBJECTIVE
Interval History: irritable when stimulated. Not cooperative and combative this afternoon warranting wrist restraints.    Review of Systems   Unable to perform ROS: Mental status change   Objective:     Vital Signs (Most Recent):  Temp: 98 °F (36.7 °C) (03/24/23 0707)  Pulse: 62 (03/24/23 1333)  Resp: 18 (03/24/23 0707)  BP: (!) 111/59 (03/24/23 0707)  SpO2: 98 % (03/24/23 0707)   Vital Signs (24h Range):  Temp:  [98 °F (36.7 °C)-98.4 °F (36.9 °C)] 98 °F (36.7 °C)  Pulse:  [60-88] 62  Resp:  [16-19] 18  SpO2:  [96 %-98 %] 98 %  BP: (109-111)/(56-59) 111/59     Weight: 57.5 kg (126 lb 12.2 oz)  Body mass index is 21.76 kg/m².  No intake or output data in the 24 hours ending 03/24/23 1456   Physical Exam  Constitutional:       Appearance: He is ill-appearing.   HENT:      Head: Normocephalic and atraumatic.      Mouth/Throat:      Mouth: Mucous membranes are dry.   Eyes:      Conjunctiva/sclera: Conjunctivae normal.   Cardiovascular:      Rate and Rhythm: Normal rate and regular rhythm.   Pulmonary:      Effort: Pulmonary effort is normal.      Breath sounds: No wheezing.   Abdominal:      Tenderness: There is no abdominal tenderness.   Musculoskeletal:         General: No swelling or deformity.      Cervical back: Neck supple.   Skin:     Coloration: Skin is not jaundiced.      Findings: Bruising (LUE) present.      Comments: Bilateral wrist restraints     Neurological:      General: No focal deficit present.      Mental Status: He is alert.      Comments: Oriented to person only   Psychiatric:         Behavior: Behavior is agitated (when stimulated) and withdrawn. Behavior is cooperative.      Comments: Irritable mood. Poor insight       Significant Labs: All pertinent labs within the past 24 hours have been reviewed.    CMP:   Recent Labs   Lab 03/23/23  0828      K 3.8      CO2 20*   GLU 82   BUN 22   CREATININE 1.7*   CALCIUM 9.0   PROT 6.4   ALBUMIN 3.0*   BILITOT 1.7*   ALKPHOS 93   AST 38   ALT 23    ANIONGAP 12       Significant Imaging: I have reviewed all pertinent imaging results/findings within the past 24 hours.

## 2023-03-24 NOTE — TELEPHONE ENCOUNTER
Returned call to pt son, son stated his dad is in the hospital and he wanted to know if the dad can take Mirlax. Pt was advised that since he is in the hospital that he should allow the hospital staff to manage his medications and treatment.

## 2023-03-24 NOTE — PROGRESS NOTES
"EKG not done due to patient refusing saying "you will mess up my heart".  Jane NP aware, will try to get EKG at a later time.  "

## 2023-03-24 NOTE — PLAN OF CARE
Patient remains injury free.  No signs or symptoms of distress noted.  Patient denies any pain or discomfort at this time. Patient oriented to self. Patient remains in soft wrist restraints and being monitored every 2 hours. All active orders reviewed and adhered to. 12 hour Chart check complete.  Continue with current poc.

## 2023-03-24 NOTE — ASSESSMENT & PLAN NOTE
-per discharge summary earlier today, family seeking [facility] NH placement.    3/23/23  He has been accepted to [facility] once medically stable  He will need TB and COVID tests prior to discharge (TB order placed)

## 2023-03-25 LAB
ALBUMIN SERPL BCP-MCNC: 2.5 G/DL (ref 3.5–5.2)
ALP SERPL-CCNC: 88 U/L (ref 55–135)
ALT SERPL W/O P-5'-P-CCNC: 20 U/L (ref 10–44)
AMMONIA PLAS-SCNC: 81 UMOL/L (ref 10–50)
ANION GAP SERPL CALC-SCNC: 8 MMOL/L (ref 8–16)
AST SERPL-CCNC: 45 U/L (ref 10–40)
BILIRUB SERPL-MCNC: 1.4 MG/DL (ref 0.1–1)
BUN SERPL-MCNC: 14 MG/DL (ref 8–23)
CALCIUM SERPL-MCNC: 8.3 MG/DL (ref 8.7–10.5)
CHLORIDE SERPL-SCNC: 102 MMOL/L (ref 95–110)
CO2 SERPL-SCNC: 23 MMOL/L (ref 23–29)
CREAT SERPL-MCNC: 1.5 MG/DL (ref 0.5–1.4)
EST. GFR  (NO RACE VARIABLE): 49 ML/MIN/1.73 M^2
GLUCOSE SERPL-MCNC: 121 MG/DL (ref 70–110)
POTASSIUM SERPL-SCNC: 2.9 MMOL/L (ref 3.5–5.1)
PROT SERPL-MCNC: 5.4 G/DL (ref 6–8.4)
SODIUM SERPL-SCNC: 133 MMOL/L (ref 136–145)

## 2023-03-25 PROCEDURE — 25000003 PHARM REV CODE 250: Performed by: NURSE PRACTITIONER

## 2023-03-25 PROCEDURE — 80053 COMPREHEN METABOLIC PANEL: CPT | Performed by: INTERNAL MEDICINE

## 2023-03-25 PROCEDURE — 25000003 PHARM REV CODE 250: Performed by: INTERNAL MEDICINE

## 2023-03-25 PROCEDURE — 11000001 HC ACUTE MED/SURG PRIVATE ROOM

## 2023-03-25 PROCEDURE — 82140 ASSAY OF AMMONIA: CPT | Performed by: INTERNAL MEDICINE

## 2023-03-25 PROCEDURE — 36415 COLL VENOUS BLD VENIPUNCTURE: CPT | Performed by: INTERNAL MEDICINE

## 2023-03-25 RX ORDER — POTASSIUM CHLORIDE 20 MEQ/1
40 TABLET, EXTENDED RELEASE ORAL
Status: COMPLETED | OUTPATIENT
Start: 2023-03-25 | End: 2023-03-26

## 2023-03-25 RX ADMIN — FINASTERIDE 5 MG: 5 TABLET, FILM COATED ORAL at 09:03

## 2023-03-25 RX ADMIN — LACTULOSE 20 G: 20 SOLUTION ORAL at 12:03

## 2023-03-25 RX ADMIN — PANTOPRAZOLE SODIUM 40 MG: 40 TABLET, DELAYED RELEASE ORAL at 09:03

## 2023-03-25 RX ADMIN — LACTULOSE 20 G: 20 SOLUTION ORAL at 09:03

## 2023-03-25 RX ADMIN — PROPRANOLOL HYDROCHLORIDE 10 MG: 10 TABLET ORAL at 09:03

## 2023-03-25 RX ADMIN — LACTULOSE 200 G: 10 SOLUTION ORAL; RECTAL at 09:03

## 2023-03-25 RX ADMIN — POTASSIUM CHLORIDE 40 MEQ: 1500 TABLET, EXTENDED RELEASE ORAL at 09:03

## 2023-03-25 RX ADMIN — LACTULOSE 20 G: 20 SOLUTION ORAL at 05:03

## 2023-03-25 RX ADMIN — FOLIC ACID 1 MG: 1 TABLET ORAL at 09:03

## 2023-03-25 RX ADMIN — THIAMINE HCL TAB 100 MG 100 MG: 100 TAB at 09:03

## 2023-03-25 RX ADMIN — RIFAXIMIN 550 MG: 550 TABLET ORAL at 09:03

## 2023-03-25 NOTE — ASSESSMENT & PLAN NOTE
-ammonia level elevated 65.    -lactulose 10 g p.o. t.i.d..    -repeat ammonia level in a.m..  -continue rifaximin    3/22/23  Ammonia higher today  Add lactulose enemas. Can discontinue once patient has bowel movements  Hepatology input appreciated  Continue management as above    3/23/23  Ammonia level improved to 68 post lactulose enemas  Continue Rifaximin  Monitor    3/24/22  Unable to get labs today  Continue lactulose enemas and Rifaximin    3/25/23  Improving  Patient is oriented and cooperative today

## 2023-03-25 NOTE — NURSING
Pt daughter called and said pt was feeling SOB. Went to pt room to assess pt and check for SOB. O2 was 99%, asked the pt was he still feeling shortness of breath and pt states he is fine. Called pt daughter to inform her that pt is doing better. Plan of care continued.

## 2023-03-25 NOTE — PLAN OF CARE
AAOx1. Pt remained free from fall and injury. No sign of any distress noted. Safety precautions alert. Restraints on right and left wrist and Q2 hour assessments completed. Cardiac monitoring. Pt asked to call for assistance if needed. No IV access MD notified. Chart checked reviewed. VSS. Plan of care continued .

## 2023-03-25 NOTE — ASSESSMENT & PLAN NOTE
-Geodon as needed  -avoid benzodiazepines   -delirium precautions    Patient was seen by psychiatry, Dr. Figueredo, on 3/15/23 who recommended continuing duloxetine 30mg BID and depakote 250 mg PO BID for delirium.   -will consult Telepsych for direction  -currently on ziprasidone IM prn    3/3/23  Telepsych evaluated, Dr. Greene, and recommended sitter or tele sitter at all times, he does not have the capacity to refuse necessary medical care at this time, no standing psychotropic meds at this time, Haldol PRN, and EKG/Qtc if he receives Haldol.    3/24/23  Avoid sedatives  Continue Haldol prn    3/25/23  Improving

## 2023-03-25 NOTE — PROGRESS NOTES
Hepatology  Consult Note    Patient Name: Regino Cowan  MRN: [id number]  Admission Date: 3/21/2023  Hospital Length of Stay: 3 days  Code Status: Full Code   Attending Provider: Linn Montoya DO   Consulting Provider: Zheng Mcclain MD  Primary Care Physician: Rod Norman MD  Principal Problem:Acute hepatic encephalopathy    Consults  Subjective:     HPI: Mr. Cowan is a 72-year-old  male with PMH of  liver cirrhosis, was admitted with hepatic encephalopathy. Patient was seen on 3/23 by  Dr. Munoz  in the clinic, and was sent to [facility] ED for admission for AMS and generalized deconditioning. CT Head negative. Overnight patient had several BMs post lactulose enemas      Family wanted to pursue assisted nursing home placement,    Interval History: No acute events overnight    Past Medical History:   Diagnosis Date    Hypertension     Liver cirrhosis        History reviewed. No pertinent surgical history.    Review of patient's allergies indicates:  No Known Allergies  Family History       Problem Relation (Age of Onset)    Heart disease Mother          Tobacco Use    Smoking status: Former     Packs/day: 2.00     Years: 28.00     Pack years: 56.00     Types: Cigarettes    Smokeless tobacco: Never    Tobacco comments:     quit about 30 years ago    Substance and Sexual Activity    Alcohol use: No     Comment: quit about 10 years ago    Drug use: No    Sexual activity: Not on file     Review of Systems  Objective:     Vital Signs (Most Recent):  Temp: 97.7 °F (36.5 °C) (03/25/23 0806)  Pulse: 69 (03/25/23 0806)  Resp: 17 (03/25/23 0806)  BP: (!) 111/56 (03/25/23 0806)  SpO2: 97 % (03/25/23 0806)   Vital Signs (24h Range):  Temp:  [97.7 °F (36.5 °C)-98.4 °F (36.9 °C)] 97.7 °F (36.5 °C)  Pulse:  [61-82] 69  Resp:  [15-18] 17  SpO2:  [95 %-99 %] 97 %  BP: (111-140)/(56-67) 111/56     Weight: 57.5 kg (126 lb 12.2 oz) (03/22/23 0836)  Body mass index is 21.76 kg/m².    No intake or  output data in the 24 hours ending 03/25/23 1005    Lines/Drains/Airways       None                   Physical Exam    Significant Labs:  CBC: No results for input(s): WBC, HGB, HCT, PLT in the last 48 hours.  CMP:   Recent Labs   Lab 03/25/23  0653   *   CALCIUM 8.3*   ALBUMIN 2.5*   PROT 5.4*   *   K 2.9*   CO2 23      BUN 14   CREATININE 1.5*   ALKPHOS 88   ALT 20   AST 45*   BILITOT 1.4*       Liver Function Test:   Recent Labs   Lab 03/25/23  0653   ALT 20   AST 45*   ALKPHOS 88   BILITOT 1.4*   PROT 5.4*   ALBUMIN 2.5*         Significant Imaging:  NA    Assessment/Plan:     Active Diagnoses:    Diagnosis Date Noted POA    PRINCIPAL PROBLEM:  Acute hepatic encephalopathy [K76.82] 02/05/2023 Yes    Delirium [R41.0] 03/16/2023 Yes    Debility [R53.81] 02/19/2023 Yes    Liver cirrhosis [K74.60]  Yes      Problems Resolved During this Admission:     Hepatic encephalopathy  Continue lactulose and rifaximin    Debility  [facility]      Cirrhosis  MELD-Na score: 15 at 2/19/2023  4:54 AM  MELD score: 12 at 2/19/2023  4:54 AM  Calculated from:  Serum Creatinine: 1.16 mg/dL at 2/19/2023  4:54 AM  Serum Sodium: 134 mmol/L at 2/19/2023  4:54 AM  Total Bilirubin: 1.4 mg/dL at 2/18/2023  5:07 AM  INR(ratio): 1.3 at 2/17/2023 10:03 AM  Age: 72 years     Not a transplant candidate     Hepatic encephalopathy  Resolving slowing   No role of monitoring ammonia  Continue lactulose and rifaximin      Delirium  -Per Primary team  Avoid sedatives      He has been accepted to [facility] for NH placement once medically stable.         Thank you for your consult. I will follow-up with patient. Please contact us if you have any additional questions.    Zheng Mcclain MD  Gastroenterology  [facility] - Med S[facility]

## 2023-03-25 NOTE — PROGRESS NOTES
[facility] Medicine  Progress Note    Patient Name: Regino Cowan  MRN: [id number]  Patient Class: IP- Inpatient   Admission Date: 3/21/2023  Length of Stay: 3 days  Attending Physician: Linn Montoya DO  Primary Care Provider: Rod Norman MD    Subjective:     Principal Problem:Acute hepatic encephalopathy      HPI:  Mr. Cowan is a 72-year-old  male with PMH significant for liver cirrhosis, was hospitalized at [facility] for hepatic encephalopathy, and discharged earlier this afternoon.  Patient was treated with lactulose, rifaximin.  Developed delirium during this hospitalization, required treatment with Haldol, Geodon per Psychiatry.  [facility], process was initiated.  However patient had hepatology appointment today 3/21/23 with Dr. Munoz, hence was [facility]  Dr. Munoz evaluated patient in the clinic, and was sent to [facility] ED for admission for AMS and generalized deconditioning.  Ammonia level elevated 65.  Received lactulose in the ED. No family at the bedside.       Overview/Hospital Course:  Regino Johnson is a 72 year old male who was admitted to [facility] for hepatic encephalopathy. Patient presented to ED with AMS in setting of elevated ammonia. CT Head negative. Administration of PO lactulose difficult due to patient's agitation so hepatology recommended lactulose enemas.    03/23/23  Telepsych evaluated as patient had behavioral issues yesterday, recommended sitter or tele sitter at all times, he has no capacity to refuse necessary medical care at this time, Haldol PRN for agitation, and EKG if he receives Haldol. Agitation may have been from increased ammonia level as he had not had a BM. Overnight patient had several BMs post lactulose enemas. More alert and responsive today. Calm and cooperative. Ammonia level 68 from 119.  He has been accepted to  [facility] for NH placement once medically stable.     3/24/23  Anticipate discharge to [facility] on Monday. Continue laxatives/Rifaximin for hepatic encephalopathy.           Interval History: patient is alert and oriented. Cooperative with care. Resume cymbalta today    Review of Systems   Constitutional:  Negative for chills, diaphoresis, fatigue and fever.   HENT:  Negative for drooling, ear pain, rhinorrhea and sore throat.    Eyes: Negative.    Respiratory:  Negative for cough, shortness of breath and wheezing.    Cardiovascular:  Negative for palpitations and leg swelling.   Gastrointestinal:  Negative for abdominal pain, constipation, diarrhea and nausea.   Endocrine: Negative.    Genitourinary:  Negative for dysuria, hematuria and urgency.   Musculoskeletal: Negative.    Skin:  Negative for color change and wound.   Allergic/Immunologic: Negative.    Neurological:  Negative for dizziness, syncope and speech difficulty.   Hematological: Negative.    Psychiatric/Behavioral: Negative.         Objective:     Vital Signs (Most Recent):  Temp: 97.9 °F (36.6 °C) (03/25/23 1625)  Pulse: 77 (03/25/23 1625)  Resp: 16 (03/25/23 1625)  BP: 114/60 (03/25/23 1625)  SpO2: 97 % (03/25/23 1625) Vital Signs (24h Range):  Temp:  [96.5 °F (35.8 °C)-98.4 °F (36.9 °C)] 97.9 °F (36.6 °C)  Pulse:  [66-82] 77  Resp:  [15-18] 16  SpO2:  [95 %-99 %] 97 %  BP: (111-138)/(56-65) 114/60     Weight: 57.5 kg (126 lb 12.2 oz)  Body mass index is 21.76 kg/m².  No intake or output data in the 24 hours ending 03/25/23 1712   Physical Exam  HENT:      Head: Normocephalic and atraumatic.      Mouth/Throat:      Mouth: Mucous membranes are dry.   Eyes:      Conjunctiva/sclera: Conjunctivae normal.   Cardiovascular:      Rate and Rhythm: Normal rate and regular rhythm.   Pulmonary:      Effort: Pulmonary effort is normal.      Breath sounds: No wheezing.   Abdominal:      Tenderness: There is no  abdominal tenderness.   Musculoskeletal:         General: No swelling or deformity.      Cervical back: Neck supple.   Skin:     Coloration: Skin is not jaundiced.      Findings: Bruising (LUE) present.      Comments:      Neurological:      General: No focal deficit present.      Mental Status: He is alert and oriented to person, place, and time. Mental status is at baseline.   Psychiatric:         Mood and Affect: Mood normal.         Speech: Speech normal.         Behavior: Behavior is agitated. Behavior is not withdrawn. Behavior is cooperative.         Judgment: Judgment normal.         Significant Labs: All pertinent labs within the past 24 hours have been reviewed.  CMP:   Recent Labs   Lab 03/25/23  0653   *   K 2.9*      CO2 23   *   BUN 14   CREATININE 1.5*   CALCIUM 8.3*   PROT 5.4*   ALBUMIN 2.5*   BILITOT 1.4*   ALKPHOS 88   AST 45*   ALT 20   ANIONGAP 8       Significant Imaging: I have reviewed all pertinent imaging results/findings within the past 24 hours.      Assessment/Plan:      * Acute hepatic encephalopathy  -ammonia level elevated 65.    -lactulose 10 g p.o. t.i.d..    -repeat ammonia level in a.m..  -continue rifaximin    3/22/23  Ammonia higher today  Add lactulose enemas. Can discontinue once patient has bowel movements  Hepatology input appreciated  Continue management as above    3/23/23  Ammonia level improved to 68 post lactulose enemas  Continue Rifaximin  Monitor    3/24/22  Unable to get labs today  Continue lactulose enemas and Rifaximin    3/25/23  Improving  Patient is oriented and cooperative today    Delirium  -Geodon as needed  -avoid benzodiazepines   -delirium precautions    Patient was seen by psychiatry, Dr. Figueredo, on 3/15/23 who recommended continuing duloxetine 30mg BID and depakote 250 mg PO BID for delirium.   -will consult Telepsych for direction  -currently on ziprasidone IM prn    3/3/23  Telepsych evaluated, Dr. Greene, and recommended sitter or  tele sitter at all times, he does not have the capacity to refuse necessary medical care at this time, no standing psychotropic meds at this time, Haldol PRN, and EKG/Qtc if he receives Haldol.    3/24/23  Avoid sedatives  Continue Haldol prn    3/25/23  Improving     Debility  -per discharge summary earlier today, family seeking [facility] placement.    3/23/23  He has been accepted to [facility] once medically stable  He will need TB and COVID tests prior to discharge (TB order placed)    3/25/23  Awaiting placement    Liver cirrhosis  -followed by Dr. Munoz in the hepatology clinic  -per GI recommendations from S , outpatient TIPS in the near future        VTE Risk Mitigation (From admission, onward)         Ordered     Place sequential compression device  Until discontinued         03/21/23 2059                Discharge Planning   CB:      Code Status: Full Code   Is the patient medically ready for discharge?:     Reason for patient still in hospital (select all that apply): Treatment  Discharge Plan A: New Nursing [facility] placement - [facility] care [facility]          Jeffrey Pham NP  Department of Hospital Medicine   [facility] - Med Surg

## 2023-03-25 NOTE — SUBJECTIVE & OBJECTIVE
Interval History: patient is alert and oriented. Cooperative with care. Resume cymbalta today    Review of Systems   Constitutional:  Negative for chills, diaphoresis, fatigue and fever.   HENT:  Negative for drooling, ear pain, rhinorrhea and sore throat.    Eyes: Negative.    Respiratory:  Negative for cough, shortness of breath and wheezing.    Cardiovascular:  Negative for palpitations and leg swelling.   Gastrointestinal:  Negative for abdominal pain, constipation, diarrhea and nausea.   Endocrine: Negative.    Genitourinary:  Negative for dysuria, hematuria and urgency.   Musculoskeletal: Negative.    Skin:  Negative for color change and wound.   Allergic/Immunologic: Negative.    Neurological:  Negative for dizziness, syncope and speech difficulty.   Hematological: Negative.    Psychiatric/Behavioral: Negative.         Objective:     Vital Signs (Most Recent):  Temp: 97.9 °F (36.6 °C) (03/25/23 1625)  Pulse: 77 (03/25/23 1625)  Resp: 16 (03/25/23 1625)  BP: 114/60 (03/25/23 1625)  SpO2: 97 % (03/25/23 1625) Vital Signs (24h Range):  Temp:  [96.5 °F (35.8 °C)-98.4 °F (36.9 °C)] 97.9 °F (36.6 °C)  Pulse:  [66-82] 77  Resp:  [15-18] 16  SpO2:  [95 %-99 %] 97 %  BP: (111-138)/(56-65) 114/60     Weight: 57.5 kg (126 lb 12.2 oz)  Body mass index is 21.76 kg/m².  No intake or output data in the 24 hours ending 03/25/23 1712   Physical Exam  HENT:      Head: Normocephalic and atraumatic.      Mouth/Throat:      Mouth: Mucous membranes are dry.   Eyes:      Conjunctiva/sclera: Conjunctivae normal.   Cardiovascular:      Rate and Rhythm: Normal rate and regular rhythm.   Pulmonary:      Effort: Pulmonary effort is normal.      Breath sounds: No wheezing.   Abdominal:      Tenderness: There is no abdominal tenderness.   Musculoskeletal:         General: No swelling or deformity.      Cervical back: Neck supple.   Skin:     Coloration: Skin is not jaundiced.      Findings: Bruising (LUE) present.      Comments:       Neurological:      General: No focal deficit present.      Mental Status: He is alert and oriented to person, place, and time. Mental status is at baseline.   Psychiatric:         Mood and Affect: Mood normal.         Speech: Speech normal.         Behavior: Behavior is agitated. Behavior is not withdrawn. Behavior is cooperative.         Judgment: Judgment normal.         Significant Labs: All pertinent labs within the past 24 hours have been reviewed.  CMP:   Recent Labs   Lab 03/25/23  0653   *   K 2.9*      CO2 23   *   BUN 14   CREATININE 1.5*   CALCIUM 8.3*   PROT 5.4*   ALBUMIN 2.5*   BILITOT 1.4*   ALKPHOS 88   AST 45*   ALT 20   ANIONGAP 8       Significant Imaging: I have reviewed all pertinent imaging results/findings within the past 24 hours.

## 2023-03-25 NOTE — ASSESSMENT & PLAN NOTE
-per discharge summary earlier today, family seeking residential NH placement.    3/23/23  He has been accepted to [facility] once medically stable  He will need TB and COVID tests prior to discharge (TB order placed)    3/25/23  Awaiting placement   02-Dec-2018 21:27

## 2023-03-26 LAB
ALBUMIN SERPL BCP-MCNC: 2.5 G/DL (ref 3.5–5.2)
ALP SERPL-CCNC: 113 U/L (ref 55–135)
ALT SERPL W/O P-5'-P-CCNC: 24 U/L (ref 10–44)
ANION GAP SERPL CALC-SCNC: 7 MMOL/L (ref 8–16)
AST SERPL-CCNC: 50 U/L (ref 10–40)
BILIRUB SERPL-MCNC: 1.1 MG/DL (ref 0.1–1)
BUN SERPL-MCNC: 11 MG/DL (ref 8–23)
CALCIUM SERPL-MCNC: 8.6 MG/DL (ref 8.7–10.5)
CHLORIDE SERPL-SCNC: 107 MMOL/L (ref 95–110)
CO2 SERPL-SCNC: 23 MMOL/L (ref 23–29)
CREAT SERPL-MCNC: 1.4 MG/DL (ref 0.5–1.4)
EST. GFR  (NO RACE VARIABLE): 53 ML/MIN/1.73 M^2
GLUCOSE SERPL-MCNC: 109 MG/DL (ref 70–110)
MAGNESIUM SERPL-MCNC: 1.7 MG/DL (ref 1.6–2.6)
POCT GLUCOSE: 110 MG/DL (ref 70–110)
POTASSIUM SERPL-SCNC: 4.5 MMOL/L (ref 3.5–5.1)
PROT SERPL-MCNC: 5.3 G/DL (ref 6–8.4)
SODIUM SERPL-SCNC: 137 MMOL/L (ref 136–145)
TB INDURATION 48 - 72 HR READ: 0 MM

## 2023-03-26 PROCEDURE — 83735 ASSAY OF MAGNESIUM: CPT | Performed by: INTERNAL MEDICINE

## 2023-03-26 PROCEDURE — 36415 COLL VENOUS BLD VENIPUNCTURE: CPT | Performed by: INTERNAL MEDICINE

## 2023-03-26 PROCEDURE — 11000001 HC ACUTE MED/SURG PRIVATE ROOM

## 2023-03-26 PROCEDURE — 80053 COMPREHEN METABOLIC PANEL: CPT | Performed by: INTERNAL MEDICINE

## 2023-03-26 PROCEDURE — 25000003 PHARM REV CODE 250: Performed by: INTERNAL MEDICINE

## 2023-03-26 PROCEDURE — 25000003 PHARM REV CODE 250: Performed by: NURSE PRACTITIONER

## 2023-03-26 PROCEDURE — 63600175 PHARM REV CODE 636 W HCPCS: Performed by: NURSE PRACTITIONER

## 2023-03-26 RX ADMIN — FOLIC ACID 1 MG: 1 TABLET ORAL at 10:03

## 2023-03-26 RX ADMIN — LACTULOSE 20 G: 20 SOLUTION ORAL at 05:03

## 2023-03-26 RX ADMIN — PANTOPRAZOLE SODIUM 40 MG: 40 TABLET, DELAYED RELEASE ORAL at 10:03

## 2023-03-26 RX ADMIN — LACTULOSE 20 G: 20 SOLUTION ORAL at 10:03

## 2023-03-26 RX ADMIN — LACTULOSE 200 G: 10 SOLUTION ORAL; RECTAL at 10:03

## 2023-03-26 RX ADMIN — RIFAXIMIN 550 MG: 550 TABLET ORAL at 10:03

## 2023-03-26 RX ADMIN — THIAMINE HCL TAB 100 MG 100 MG: 100 TAB at 10:03

## 2023-03-26 RX ADMIN — PROPRANOLOL HYDROCHLORIDE 10 MG: 10 TABLET ORAL at 08:03

## 2023-03-26 RX ADMIN — HALOPERIDOL LACTATE 2 MG: 5 INJECTION, SOLUTION INTRAMUSCULAR at 11:03

## 2023-03-26 RX ADMIN — RIFAXIMIN 550 MG: 550 TABLET ORAL at 08:03

## 2023-03-26 RX ADMIN — POTASSIUM CHLORIDE 40 MEQ: 1500 TABLET, EXTENDED RELEASE ORAL at 12:03

## 2023-03-26 RX ADMIN — LACTULOSE 20 G: 20 SOLUTION ORAL at 08:03

## 2023-03-26 RX ADMIN — FINASTERIDE 5 MG: 5 TABLET, FILM COATED ORAL at 10:03

## 2023-03-26 RX ADMIN — LACTULOSE 20 G: 20 SOLUTION ORAL at 02:03

## 2023-03-26 NOTE — PROGRESS NOTES
[facility] Medicine  Progress Note    Patient Name: Regino Cowan  MRN: [id number]  Patient Class: IP- Inpatient   Admission Date: 3/21/2023  Length of Stay: 4 days  Attending Physician: Linn Montoya DO  Primary Care Provider: Rod Norman MD        Subjective:     Principal Problem:Acute hepatic encephalopathy        HPI:  Mr. Cowan is a 72-year-old  male with PMH significant for liver cirrhosis, was hospitalized at [facility] for hepatic encephalopathy, and discharged earlier this afternoon.  Patient was treated with lactulose, rifaximin.  Developed delirium during this hospitalization, required treatment with Haldol, Geodon per Psychiatry.  Family wanted to pursue [facility] nursing home placement, process was initiated.  However patient had hepatology appointment today 3/21/23 with Dr. Munoz, hence was discharged from [facility].  Dr. Munoz evaluated patient in the clinic, and was sent to [facility] ED for admission for AMS and generalized deconditioning.  Ammonia level elevated 65.  Received lactulose in the ED. No family at the bedside.       Overview/Hospital Course:  Regino Johnson is a 72 year old male who was admitted to [facility] for hepatic encephalopathy. Patient presented to ED with AMS in setting of elevated ammonia. CT Head negative. Administration of PO lactulose difficult due to patient's agitation so hepatology recommended lactulose enemas.    03/23/23  Telepsych evaluated as patient had behavioral issues yesterday, recommended sitter or tele sitter at all times, he has no capacity to refuse necessary medical care at this time, Haldol PRN for agitation, and EKG if he receives Haldol. Agitation may have been from increased ammonia level as he had not had a BM. Overnight patient had several BMs post lactulose enemas. More alert and responsive today. Calm and cooperative. Ammonia level 68 from 119.  He has been accepted to  [facility] for NH placement once medically stable.     3/24/23  Anticipate discharge to [facility] on Monday. Continue laxatives/Rifaximin for hepatic encephalopathy.           Interval History: patient has been pleasant and cooperative the last two days. Awaiting placement    Review of Systems   Constitutional:  Negative for chills, diaphoresis, fatigue and fever.   HENT:  Negative for drooling, ear pain, rhinorrhea and sore throat.    Eyes: Negative.    Respiratory:  Negative for cough, shortness of breath and wheezing.    Cardiovascular:  Negative for palpitations and leg swelling.   Gastrointestinal:  Negative for abdominal pain, constipation, diarrhea and nausea.   Endocrine: Negative.    Genitourinary:  Negative for dysuria, hematuria and urgency.   Musculoskeletal: Negative.    Skin:  Negative for color change and wound.   Allergic/Immunologic: Negative.    Neurological:  Negative for dizziness, syncope and speech difficulty.   Hematological: Negative.    Psychiatric/Behavioral: Negative.       Objective:     Vital Signs (Most Recent):  Temp: 97.5 °F (36.4 °C) (03/26/23 1213)  Pulse: 73 (03/26/23 1213)  Resp: 16 (03/26/23 1213)  BP: 124/61 (03/26/23 1213)  SpO2: 97 % (03/26/23 1213) Vital Signs (24h Range):  Temp:  [97.5 °F (36.4 °C)-98.3 °F (36.8 °C)] 97.5 °F (36.4 °C)  Pulse:  [73-84] 73  Resp:  [16-18] 16  SpO2:  [94 %-99 %] 97 %  BP: (108-131)/(56-61) 124/61     Weight: 63.2 kg (139 lb 5.3 oz)  Body mass index is 23.92 kg/m².  No intake or output data in the 24 hours ending 03/26/23 1547     Physical Exam  HENT:      Head: Normocephalic and atraumatic.      Mouth/Throat:      Mouth: Mucous membranes are dry.   Eyes:      Conjunctiva/sclera: Conjunctivae normal.   Cardiovascular:      Rate and Rhythm: Normal rate and regular rhythm.   Pulmonary:      Effort: Pulmonary effort is normal.      Breath sounds: No wheezing.   Abdominal:      Tenderness: There is no  abdominal tenderness.   Musculoskeletal:         General: No swelling or deformity.      Cervical back: Neck supple.   Skin:     Coloration: Skin is not jaundiced.      Findings: Bruising (LUE) present.      Comments:      Neurological:      General: No focal deficit present.      Mental Status: He is alert and oriented to person, place, and time. Mental status is at baseline.   Psychiatric:         Mood and Affect: Mood normal.         Speech: Speech normal.         Behavior: Behavior is not agitated or withdrawn. Behavior is cooperative.         Judgment: Judgment normal.         Significant Labs: All pertinent labs within the past 24 hours have been reviewed.    CBC: No results for input(s): WBC, HGB, HCT, PLT in the last 48 hours.    CMP:   Recent Labs   Lab 03/25/23  0653 03/26/23  0642   * 137   K 2.9* 4.5    107   CO2 23 23   * 109   BUN 14 11   CREATININE 1.5* 1.4   CALCIUM 8.3* 8.6*   PROT 5.4* 5.3*   ALBUMIN 2.5* 2.5*   BILITOT 1.4* 1.1*   ALKPHOS 88 113   AST 45* 50*   ALT 20 24   ANIONGAP 8 7*       Significant Imaging: I have reviewed all pertinent imaging results/findings within the past 24 hours.      Assessment/Plan:      * Acute hepatic encephalopathy  -ammonia level elevated 65.    -lactulose 10 g p.o. t.i.d..    -repeat ammonia level in a.m..  -continue rifaximin    3/22/23  Ammonia higher today  Add lactulose enemas. Can discontinue once patient has bowel movements  Hepatology input appreciated  Continue management as above    3/23/23  Ammonia level improved to 68 post lactulose enemas  Continue Rifaximin  Monitor    3/24/22  Unable to get labs today  Continue lactulose enemas and Rifaximin    3/25/23  Improving  Patient is oriented and cooperative today    2/26/23  resolved    Delirium  -Geodon as needed  -avoid benzodiazepines   -delirium precautions    Patient was seen by psychiatry, Dr. Figueredo, on 3/15/23 who recommended continuing duloxetine 30mg BID and depakote 250 mg PO  BID for delirium.   -will consult Telepsych for direction  -currently on ziprasidone IM prn    3/3/23  Telepsych evaluated, Dr. Greene, and recommended sitter or tele sitter at all times, he does not have the capacity to refuse necessary medical care at this time, no standing psychotropic meds at this time, Haldol PRN, and EKG/Qtc if he receives Haldol.    3/24/23  Avoid sedatives  Continue Haldol prn    3/25/23  Improving     3/26/23  resolved    Debility  -per discharge summary earlier today, family seeking [facility] NH placement.    3/23/23  He has been accepted to [facility] once medically stable  He will need TB and COVID tests prior to discharge (TB order placed)    3/25/23  Awaiting placement    Hypokalemia  Resolved        Liver cirrhosis  -followed by Dr. Munoz in the hepatology clinic  -per GI recommendations from [facility], outpatient TIPS in the near future        VTE Risk Mitigation (From admission, onward)         Ordered     Place sequential compression device  Until discontinued         03/21/23 2059                Discharge Planning   CB:      Code Status: Full Code   Is the patient medically ready for discharge?:     Reason for patient still in hospital (select all that apply): Pending disposition  Discharge Plan A: New Nursing [facility] placement - [facility]        Jeffrey Pham NP  Department of Hospital Medicine   [facility] - Med Surg

## 2023-03-26 NOTE — ASSESSMENT & PLAN NOTE
-per discharge summary earlier today, [facility].    3/23/23  He has been accepted to [facility] once medically stable  He will need TB and COVID tests prior to discharge (TB order placed)    3/25/23  Awaiting placement

## 2023-03-26 NOTE — PLAN OF CARE
AAOx2. Pt tolerated is medication well. Pt received scheduled potassium and took oral and enema lactulose without any problems. Pt remained free from fall and injury. No sign of any distress noted. Safety precautions alert. Pt asked to call for assistance if needed. No IV access. Cardiac monitoring. Chart checked reviewed. VSS. Plan of care continued.

## 2023-03-26 NOTE — SUBJECTIVE & OBJECTIVE
Interval History: patient has been pleasant and cooperative the last two days. Awaiting placement    Review of Systems   Constitutional:  Negative for chills, diaphoresis, fatigue and fever.   HENT:  Negative for drooling, ear pain, rhinorrhea and sore throat.    Eyes: Negative.    Respiratory:  Negative for cough, shortness of breath and wheezing.    Cardiovascular:  Negative for palpitations and leg swelling.   Gastrointestinal:  Negative for abdominal pain, constipation, diarrhea and nausea.   Endocrine: Negative.    Genitourinary:  Negative for dysuria, hematuria and urgency.   Musculoskeletal: Negative.    Skin:  Negative for color change and wound.   Allergic/Immunologic: Negative.    Neurological:  Negative for dizziness, syncope and speech difficulty.   Hematological: Negative.    Psychiatric/Behavioral: Negative.       Objective:     Vital Signs (Most Recent):  Temp: 97.5 °F (36.4 °C) (03/26/23 1213)  Pulse: 73 (03/26/23 1213)  Resp: 16 (03/26/23 1213)  BP: 124/61 (03/26/23 1213)  SpO2: 97 % (03/26/23 1213) Vital Signs (24h Range):  Temp:  [97.5 °F (36.4 °C)-98.3 °F (36.8 °C)] 97.5 °F (36.4 °C)  Pulse:  [73-84] 73  Resp:  [16-18] 16  SpO2:  [94 %-99 %] 97 %  BP: (108-131)/(56-61) 124/61     Weight: 63.2 kg (139 lb 5.3 oz)  Body mass index is 23.92 kg/m².  No intake or output data in the 24 hours ending 03/26/23 1547     Physical Exam  HENT:      Head: Normocephalic and atraumatic.      Mouth/Throat:      Mouth: Mucous membranes are dry.   Eyes:      Conjunctiva/sclera: Conjunctivae normal.   Cardiovascular:      Rate and Rhythm: Normal rate and regular rhythm.   Pulmonary:      Effort: Pulmonary effort is normal.      Breath sounds: No wheezing.   Abdominal:      Tenderness: There is no abdominal tenderness.   Musculoskeletal:         General: No swelling or deformity.      Cervical back: Neck supple.   Skin:     Coloration: Skin is not jaundiced.      Findings: Bruising (LUE) present.      Comments:       Neurological:      General: No focal deficit present.      Mental Status: He is alert and oriented to person, place, and time. Mental status is at baseline.   Psychiatric:         Mood and Affect: Mood normal.         Speech: Speech normal.         Behavior: Behavior is not agitated or withdrawn. Behavior is cooperative.         Judgment: Judgment normal.         Significant Labs: All pertinent labs within the past 24 hours have been reviewed.    CBC: No results for input(s): WBC, HGB, HCT, PLT in the last 48 hours.    CMP:   Recent Labs   Lab 03/25/23  0653 03/26/23  0642   * 137   K 2.9* 4.5    107   CO2 23 23   * 109   BUN 14 11   CREATININE 1.5* 1.4   CALCIUM 8.3* 8.6*   PROT 5.4* 5.3*   ALBUMIN 2.5* 2.5*   BILITOT 1.4* 1.1*   ALKPHOS 88 113   AST 45* 50*   ALT 20 24   ANIONGAP 8 7*       Significant Imaging: I have reviewed all pertinent imaging results/findings within the past 24 hours.

## 2023-03-26 NOTE — ASSESSMENT & PLAN NOTE
-Geodon as needed  -avoid benzodiazepines   -delirium precautions    Patient was seen by psychiatry, Dr. Figueredo, on 3/15/23 who recommended continuing duloxetine 30mg BID and depakote 250 mg PO BID for delirium.   -will consult Telepsych for direction  -currently on ziprasidone IM prn    3/3/23  Telepsych evaluated, Dr. Greene, and recommended sitter or tele sitter at all times, he does not have the capacity to refuse necessary medical care at this time, no standing psychotropic meds at this time, Haldol PRN, and EKG/Qtc if he receives Haldol.    3/24/23  Avoid sedatives  Continue Haldol prn    3/25/23  Improving     3/26/23  resolved

## 2023-03-26 NOTE — ASSESSMENT & PLAN NOTE
-ammonia level elevated 65.    -lactulose 10 g p.o. t.i.d..    -repeat ammonia level in a.m..  -continue rifaximin    3/22/23  Ammonia higher today  Add lactulose enemas. Can discontinue once patient has bowel movements  Hepatology input appreciated  Continue management as above    3/23/23  Ammonia level improved to 68 post lactulose enemas  Continue Rifaximin  Monitor    3/24/22  Unable to get labs today  Continue lactulose enemas and Rifaximin    3/25/23  Improving  Patient is oriented and cooperative today    2/26/23  resolved

## 2023-03-26 NOTE — PROGRESS NOTES
Hepatology  Consult Note    Patient Name: Regino Cowan  MRN: [id number]  Admission Date: 3/21/2023  Hospital Length of Stay: 4 days  Code Status: Full Code   Attending Provider: Linn Montoya DO   Consulting Provider: Zheng Mcclain MD  Primary Care Physician: Rod Norman MD  Principal Problem:Acute hepatic encephalopathy    Consults  Subjective:     HPI: Mr. Cowan is a 72-year-old  male with PMH of  liver cirrhosis, was admitted with hepatic encephalopathy. Patient was seen on 3/23 by  Dr. Munoz  in the clinic, and was sent to [facility] ED for admission for AMS and generalized deconditioning. CT Head negative. Overnight patient had several BMs post lactulose enemas      Family wanted to pursue longterm nursing home placement,    Interval History: No acute events overnight    Past Medical History:   Diagnosis Date    Hypertension     Liver cirrhosis        History reviewed. No pertinent surgical history.    Review of patient's allergies indicates:  No Known Allergies  Family History       Problem Relation (Age of Onset)    Heart disease Mother          Tobacco Use    Smoking status: Former     Packs/day: 2.00     Years: 28.00     Pack years: 56.00     Types: Cigarettes    Smokeless tobacco: Never    Tobacco comments:     quit about 30 years ago    Substance and Sexual Activity    Alcohol use: No     Comment: quit about 10 years ago    Drug use: No    Sexual activity: Not on file     Review of Systems  Objective:     Vital Signs (Most Recent):  Temp: 98.3 °F (36.8 °C) (03/26/23 0723)  Pulse: 80 (03/26/23 0723)  Resp: 18 (03/26/23 0723)  BP: (!) 108/56 (03/26/23 0723)  SpO2: 97 % (03/26/23 0723)   Vital Signs (24h Range):  Temp:  [96.5 °F (35.8 °C)-98.3 °F (36.8 °C)] 98.3 °F (36.8 °C)  Pulse:  [72-84] 80  Resp:  [16-18] 18  SpO2:  [94 %-99 %] 97 %  BP: (108-131)/(56-65) 108/56     Weight: 63.2 kg (139 lb 5.3 oz) (03/26/23 0021)  Body mass index is 23.92 kg/m².    No intake or  output data in the 24 hours ending 03/26/23 1055    Lines/Drains/Airways       None                   Physical Exam    Significant Labs:  CBC: No results for input(s): WBC, HGB, HCT, PLT in the last 48 hours.  CMP:   Recent Labs   Lab 03/26/23  0642      CALCIUM 8.6*   ALBUMIN 2.5*   PROT 5.3*      K 4.5   CO2 23      BUN 11   CREATININE 1.4   ALKPHOS 113   ALT 24   AST 50*   BILITOT 1.1*       Liver Function Test:   Recent Labs   Lab 03/25/23  0653 03/26/23  0642   ALT 20 24   AST 45* 50*   ALKPHOS 88 113   BILITOT 1.4* 1.1*   PROT 5.4* 5.3*   ALBUMIN 2.5* 2.5*         Significant Imaging:  NA    Assessment/Plan:     Active Diagnoses:    Diagnosis Date Noted POA    PRINCIPAL PROBLEM:  Acute hepatic encephalopathy [K76.82] 02/05/2023 Yes    Delirium [R41.0] 03/16/2023 Yes    Debility [R53.81] 02/19/2023 Yes    Hypokalemia [E87.6] 05/11/2022 Yes    Liver cirrhosis [K74.60]  Yes      Problems Resolved During this Admission:     Hepatic encephalopathy  Continue lactulose and rifaximin  Resolving slowing   BMs 2-3 semisolid per day as target  Correct hypokalemia as precipitating factor    Cirrhosis  MELD-Na score: 15 at 2/19/2023  4:54 AM  MELD score: 12 at 2/19/2023  4:54 AM  Calculated from:  Serum Creatinine: 1.16 mg/dL at 2/19/2023  4:54 AM  Serum Sodium: 134 mmol/L at 2/19/2023  4:54 AM  Total Bilirubin: 1.4 mg/dL at 2/18/2023  5:07 AM  INR(ratio): 1.3 at 2/17/2023 10:03 AM  Age: 72 years     Not a transplant candidate     Debility  family seeking [facility] NH placement  Anticipate discharge to [facility] on Monday      Delirium  -Per Primary team  Avoid sedatives      He has been accepted to [facility] for NH placement once medically stable.         Thank you for your consult. I will follow-up with patient. Please contact us if you have any additional questions.    Zheng Mcclain MD  Gastroenterology  [facility]

## 2023-03-27 LAB
ALBUMIN SERPL BCP-MCNC: 2.9 G/DL (ref 3.5–5.2)
ALP SERPL-CCNC: 136 U/L (ref 55–135)
ALT SERPL W/O P-5'-P-CCNC: 27 U/L (ref 10–44)
ANION GAP SERPL CALC-SCNC: 6 MMOL/L (ref 8–16)
AST SERPL-CCNC: 45 U/L (ref 10–40)
BILIRUB SERPL-MCNC: 1.8 MG/DL (ref 0.1–1)
BUN SERPL-MCNC: 12 MG/DL (ref 8–23)
CALCIUM SERPL-MCNC: 9.2 MG/DL (ref 8.7–10.5)
CHLORIDE SERPL-SCNC: 106 MMOL/L (ref 95–110)
CO2 SERPL-SCNC: 24 MMOL/L (ref 23–29)
CREAT SERPL-MCNC: 1.2 MG/DL (ref 0.5–1.4)
EST. GFR  (NO RACE VARIABLE): >60 ML/MIN/1.73 M^2
GLUCOSE SERPL-MCNC: 102 MG/DL (ref 70–110)
INR PPP: 1.2 (ref 0.8–1.2)
MAGNESIUM SERPL-MCNC: 1.7 MG/DL (ref 1.6–2.6)
POTASSIUM SERPL-SCNC: 4.1 MMOL/L (ref 3.5–5.1)
PROT SERPL-MCNC: 6.2 G/DL (ref 6–8.4)
PROTHROMBIN TIME: 13.2 SEC (ref 9–12.5)
SARS-COV-2 RDRP RESP QL NAA+PROBE: NEGATIVE
SODIUM SERPL-SCNC: 136 MMOL/L (ref 136–145)

## 2023-03-27 PROCEDURE — 80053 COMPREHEN METABOLIC PANEL: CPT | Performed by: INTERNAL MEDICINE

## 2023-03-27 PROCEDURE — 25000003 PHARM REV CODE 250: Performed by: INTERNAL MEDICINE

## 2023-03-27 PROCEDURE — 36415 COLL VENOUS BLD VENIPUNCTURE: CPT | Performed by: NURSE PRACTITIONER

## 2023-03-27 PROCEDURE — U0002 COVID-19 LAB TEST NON-CDC: HCPCS | Performed by: HOSPITALIST

## 2023-03-27 PROCEDURE — 83735 ASSAY OF MAGNESIUM: CPT | Performed by: INTERNAL MEDICINE

## 2023-03-27 PROCEDURE — 11000001 HC ACUTE MED/SURG PRIVATE ROOM

## 2023-03-27 PROCEDURE — 25000003 PHARM REV CODE 250: Performed by: HOSPITALIST

## 2023-03-27 PROCEDURE — 85610 PROTHROMBIN TIME: CPT | Performed by: NURSE PRACTITIONER

## 2023-03-27 PROCEDURE — 63600175 PHARM REV CODE 636 W HCPCS: Performed by: NURSE PRACTITIONER

## 2023-03-27 RX ORDER — HALOPERIDOL 5 MG/ML
2 INJECTION INTRAMUSCULAR ONCE
Status: DISCONTINUED | OUTPATIENT
Start: 2023-03-27 | End: 2023-03-27

## 2023-03-27 RX ORDER — LACTULOSE 10 G/15ML
20 SOLUTION ORAL 3 TIMES DAILY
Status: DISCONTINUED | OUTPATIENT
Start: 2023-03-27 | End: 2023-04-01 | Stop reason: HOSPADM

## 2023-03-27 RX ADMIN — HALOPERIDOL LACTATE 2 MG: 5 INJECTION, SOLUTION INTRAMUSCULAR at 12:03

## 2023-03-27 RX ADMIN — HALOPERIDOL LACTATE 2 MG: 5 INJECTION, SOLUTION INTRAMUSCULAR at 08:03

## 2023-03-27 NOTE — PLAN OF CARE
Case Management  spoke with  patient's daughter over the phone. Hospitals are required to deliver the Important Message from Medicare (IMM) to all Medicare beneficiaries who are hospital inpatients. The IMM informs hospitalized inpatient beneficiaries of their hospital discharge appeal rights. Explained IMM appeal process and answered all questions. Pt referred to  if appeal is sought.

## 2023-03-27 NOTE — ASSESSMENT & PLAN NOTE
-Geodon as needed  -avoid benzodiazepines   -delirium precautions    Patient was seen by psychiatry, Dr. Figueredo, on 3/15/23 who recommended continuing duloxetine 30mg BID and depakote 250 mg PO BID for delirium.   -will consult Telepsych for direction  -currently on ziprasidone IM prn    3/3/23  Telepsych evaluated, Dr. Greene, and recommended sitter or tele sitter at all times, he does not have the capacity to refuse necessary medical care at this time, no standing psychotropic meds at this time, Haldol PRN, and EKG/Qtc if he receives Haldol.    3/24/23  Avoid sedatives  Continue Haldol prn    3/25/23  Improving     3/27/23  resolved

## 2023-03-27 NOTE — ASSESSMENT & PLAN NOTE
Family seeking nursing home NH placement.    3/23/23  He has been accepted to [facility] once medically stable  He will need TB and COVID tests prior to discharge (TB order placed)    3/27/23  Awaiting placement

## 2023-03-27 NOTE — NURSING
"At approximately 2330 patient attempted to get out of bed and was in a much more confused state than start of shift stating "I have to get my money." With some redirection, RNs were able to get patient back in bed. Shortly after, patient attempted to get out of bed again and became increasingly agitated and combative and began shouting obscenities at staff and threatening to call the police. Patient was offered to be pushed around the unit in a wheelchair for a change of scenery, patient aggressively declined. It required several staff members to get patient back in bed, patient remaining agitated and combative. NP notified of patient change in mental status. Patient again got out of bed and became physically aggressive toward staff trying to hit, spit, and throw items at the bedside. Norma hidalgo called at this time and PRN IM haldol given to patient after being put back in bed by several staff. Security arrived at bedside. Patient hit RN and security assisted to calm and redirect patient. After some time, patient began to calm and remained in bed. NP at bedside to assess patient. AvaSys and bedalarm in use. Call light in reach, any other potentially hazardous items removed from room/out of reach of patient. Will continue to monitor.  "

## 2023-03-27 NOTE — ASSESSMENT & PLAN NOTE
-ammonia level elevated 65.    -lactulose 10 g p.o. t.i.d..    -repeat ammonia level in a.m..  -continue rifaximin    3/22/23  Ammonia higher today  Add lactulose enemas. Can discontinue once patient has bowel movements  Hepatology input appreciated  Continue management as above    3/23/23  Ammonia level improved to 68 post lactulose enemas  Continue Rifaximin  Monitor    3/24/22  Unable to get labs today  Continue lactulose enemas and Rifaximin    3/25/23  Improving  Patient is oriented and cooperative today    3/27/23  Resolved, continue lactulose and rifaximin

## 2023-03-27 NOTE — ASSESSMENT & PLAN NOTE
Followed by Dr. Munoz in the hepatology clinic  Per GI recommendations from S , outpatient TIPS in the near future

## 2023-03-27 NOTE — PLAN OF CARE
Patient remains free of injury at this time. VSS. Reorienting as needed due to confusion/agitation. Continuing progressing toward goals of improved labs and mental status. Pending safe d/c to NH. Bedalarm and avasys in use. Call light in reach. Will continue to monitor.    Problem: Skin Injury Risk Increased  Goal: Skin Health and Integrity  Outcome: Ongoing, Progressing     Problem: Adult Inpatient Plan of Care  Goal: Patient-Specific Goal (Individualized)  Outcome: Ongoing, Progressing     Problem: Adult Inpatient Plan of Care  Goal: Absence of Hospital-Acquired Illness or Injury  Outcome: Ongoing, Progressing     Problem: Adult Inpatient Plan of Care  Goal: Optimal Comfort and Wellbeing  Outcome: Ongoing, Progressing     Problem: Fall Injury Risk  Goal: Absence of Fall and Fall-Related Injury  Outcome: Ongoing, Progressing

## 2023-03-27 NOTE — PLAN OF CARE
Spoke to nursing home, unable to accept today because patient was in restraints Friday.  Earliest nursing home will be able to accept is tomorrow.  Dr King updated.

## 2023-03-27 NOTE — NURSING
"Patient ambulating out of room into the hallway because he "wants to go home."  He ambulated around the unit looking for the way out with staff.  He was refusing to stay in his room because it is "dirty and there are sick people here."  At this time he was unable to be redirected and began cursing at staff.  PRN medication given.  "

## 2023-03-27 NOTE — SIGNIFICANT EVENT
Called to bedside. Pt is combative and security is at bedside. Pt seen and examined. Prn haldol given and pt improved. Sleeping at this time. Appears stable.

## 2023-03-27 NOTE — PROGRESS NOTES
[facility] Medicine  Progress Note    Patient Name: Regino Cowan  MRN: [id number]  Patient Class: IP- Inpatient   Admission Date: 3/21/2023  Length of Stay: 5 days  Attending Physician: Samy King MD  Primary Care Provider: Rod Norman MD        Subjective:     Principal Problem:Acute hepatic encephalopathy        HPI:  Mr. Cowan is a 72-year-old  male with PMH significant for liver cirrhosis, was hospitalized at [facility] for hepatic encephalopathy, and discharged earlier this afternoon.  Patient was treated with lactulose, rifaximin.  Developed delirium during this hospitalization, required treatment with Haldol, Geodon per Psychiatry.  Family wanted to pursue [facility] nursing home placement, process was initiated.  However patient had hepatology appointment today 3/21/23 with Dr. Munoz, hence was discharged from [facility].  Dr. Munoz evaluated patient in the clinic, and was sent to [facility] ED for admission for AMS and generalized deconditioning.  Ammonia level elevated 65.  Received lactulose in the ED. No family at the bedside.       Overview/Hospital Course:  Regino Johnson is a 72 year old male who was admitted to [facility] for hepatic encephalopathy. Patient presented to ED with AMS in setting of elevated ammonia. CT Head negative. Administration of PO lactulose difficult due to patient's agitation so hepatology recommended lactulose enemas.    03/23/23  Telepsych evaluated as patient had behavioral issues yesterday, recommended sitter or tele sitter at all times, he has no capacity to refuse necessary medical care at this time, Haldol PRN for agitation, and EKG if he receives Haldol. Agitation may have been from increased ammonia level as he had not had a BM. Overnight patient had several BMs post lactulose enemas. More alert and responsive today. Calm and cooperative. Ammonia level 68 from 119.  He has been accepted to  [facility] for NH placement once medically stable.     3/24/23  Anticipate discharge to [facility] on Monday. Continue laxatives/Rifaximin for hepatic encephalopathy.     3/27/23  Reviewed overnight events. Haldol IM given. Patient awake, alert this morning, dressed in preperation to leave. NH requesting patient be out of restraints x 72 hours per  and nursing staff. Plan to d/c in AM if remains stable. Updated patient's daughter over phone.        Review of Systems   All other systems reviewed and are negative.  Objective:     Vital Signs (Most Recent):  Temp: 97.7 °F (36.5 °C) (03/27/23 1115)  Pulse: 77 (03/27/23 1115)  Resp: 18 (03/27/23 1115)  BP: (!) 142/59 (03/27/23 1115)  SpO2: 98 % (03/27/23 1115)   Vital Signs (24h Range):  Temp:  [97.6 °F (36.4 °C)-98.3 °F (36.8 °C)] 97.7 °F (36.5 °C)  Pulse:  [69-85] 77  Resp:  [16-18] 18  SpO2:  [98 %-100 %] 98 %  BP: (115-142)/(57-89) 142/59     Weight: 63.2 kg (139 lb 5.3 oz)  Body mass index is 23.92 kg/m².  No intake or output data in the 24 hours ending 03/27/23 1322   Physical Exam  Constitutional:       General: He is not in acute distress.     Appearance: Normal appearance.   Cardiovascular:      Rate and Rhythm: Normal rate and regular rhythm.      Heart sounds: No murmur heard.  Pulmonary:      Effort: Pulmonary effort is normal. No respiratory distress.      Breath sounds: Normal breath sounds. No wheezing.   Abdominal:      General: There is no distension.      Palpations: Abdomen is soft.      Tenderness: There is no abdominal tenderness.   Neurological:      Mental Status: He is alert.       Significant Labs: All pertinent labs within the past 24 hours have been reviewed.  CBC: No results for input(s): WBC, HGB, HCT, PLT in the last 48 hours.  CMP:   Recent Labs   Lab 03/26/23  0642 03/27/23  0835    136   K 4.5 4.1    106   CO2 23 24    102   BUN 11 12   CREATININE 1.4 1.2    CALCIUM 8.6* 9.2   PROT 5.3* 6.2   ALBUMIN 2.5* 2.9*   BILITOT 1.1* 1.8*   ALKPHOS 113 136*   AST 50* 45*   ALT 24 27   ANIONGAP 7* 6*       Significant Imaging: I have reviewed all pertinent imaging results/findings within the past 24 hours.      Assessment/Plan:      * Acute hepatic encephalopathy  -ammonia level elevated 65.    -lactulose 10 g p.o. t.i.d..    -repeat ammonia level in a.m..  -continue rifaximin    3/22/23  Ammonia higher today  Add lactulose enemas. Can discontinue once patient has bowel movements  Hepatology input appreciated  Continue management as above    3/23/23  Ammonia level improved to 68 post lactulose enemas  Continue Rifaximin  Monitor    3/24/22  Unable to get labs today  Continue lactulose enemas and Rifaximin    3/25/23  Improving  Patient is oriented and cooperative today    3/27/23  Resolved, continue lactulose and rifaximin    Delirium  -Geodon as needed  -avoid benzodiazepines   -delirium precautions    Patient was seen by psychiatry, Dr. Figueredo, on 3/15/23 who recommended continuing duloxetine 30mg BID and depakote 250 mg PO BID for delirium.   -will consult Telepsych for direction  -currently on ziprasidone IM prn    3/3/23  Telepsych evaluated, Dr. Greene, and recommended sitter or tele sitter at all times, he does not have the capacity to refuse necessary medical care at this time, no standing psychotropic meds at this time, Haldol PRN, and EKG/Qtc if he receives Haldol.    3/24/23  Avoid sedatives  Continue Haldol prn    3/25/23  Improving     3/27/23  resolved    Liver cirrhosis  Followed by Dr. Munoz in the hepatology clinic  Per GI recommendations from S , outpatient TIPS in the near future    Hypokalemia  Resolved      Debility  Family seeking residential NH placement.    3/23/23  He has been accepted to [facility] once medically stable  He will need TB and COVID tests prior to discharge (TB order placed)    3/27/23  Awaiting placement      VTE  Risk Mitigation (From admission, onward)         Ordered     Place sequential compression device  Until discontinued         03/21/23 2059                Discharge Planning   CB: 3/27/2023     Code Status: Full Code   Is the patient medically ready for discharge?:     Reason for patient still in hospital (select all that apply): Pending disposition  Discharge Plan A: New Nursing Home placement - [facility] care facility                  Samy King MD  Department of Hospital Medicine   [facility] Med S[facility]

## 2023-03-27 NOTE — NURSING
Patient has had three large BMs this afternoon. Order in place for lactulose enemas as well as oral lactulose. MD contacted to clarify need for continuing lactulose enema at this time. MD Geovani gave order to dc enema. Will continue to monitor output and labs.

## 2023-03-27 NOTE — SUBJECTIVE & OBJECTIVE
Review of Systems   All other systems reviewed and are negative.  Objective:     Vital Signs (Most Recent):  Temp: 97.7 °F (36.5 °C) (03/27/23 1115)  Pulse: 77 (03/27/23 1115)  Resp: 18 (03/27/23 1115)  BP: (!) 142/59 (03/27/23 1115)  SpO2: 98 % (03/27/23 1115)   Vital Signs (24h Range):  Temp:  [97.6 °F (36.4 °C)-98.3 °F (36.8 °C)] 97.7 °F (36.5 °C)  Pulse:  [69-85] 77  Resp:  [16-18] 18  SpO2:  [98 %-100 %] 98 %  BP: (115-142)/(57-89) 142/59     Weight: 63.2 kg (139 lb 5.3 oz)  Body mass index is 23.92 kg/m².  No intake or output data in the 24 hours ending 03/27/23 1322   Physical Exam  Constitutional:       General: He is not in acute distress.     Appearance: Normal appearance.   Cardiovascular:      Rate and Rhythm: Normal rate and regular rhythm.      Heart sounds: No murmur heard.  Pulmonary:      Effort: Pulmonary effort is normal. No respiratory distress.      Breath sounds: Normal breath sounds. No wheezing.   Abdominal:      General: There is no distension.      Palpations: Abdomen is soft.      Tenderness: There is no abdominal tenderness.   Neurological:      Mental Status: He is alert.       Significant Labs: All pertinent labs within the past 24 hours have been reviewed.  CBC: No results for input(s): WBC, HGB, HCT, PLT in the last 48 hours.  CMP:   Recent Labs   Lab 03/26/23  0642 03/27/23  0835    136   K 4.5 4.1    106   CO2 23 24    102   BUN 11 12   CREATININE 1.4 1.2   CALCIUM 8.6* 9.2   PROT 5.3* 6.2   ALBUMIN 2.5* 2.9*   BILITOT 1.1* 1.8*   ALKPHOS 113 136*   AST 50* 45*   ALT 24 27   ANIONGAP 7* 6*       Significant Imaging: I have reviewed all pertinent imaging results/findings within the past 24 hours.

## 2023-03-27 NOTE — PROGRESS NOTES
Hepatology  Consult Note    Patient Name: Regino Cowan  MRN: [id number]  Admission Date: 3/21/2023  Hospital Length of Stay: 5 days  Code Status: Full Code   Attending Provider: Samy King MD   Consulting Provider: Zheng Mcclain MD  Primary Care Physician: Rod Norman MD  Principal Problem:Acute hepatic encephalopathy    Consults  Subjective:     HPI: Mr. Cowan is a 72-year-old  male with PMH of  liver cirrhosis, was admitted with hepatic encephalopathy. Patient was seen on 3/23 by  Dr. Munoz  in the clinic, and was sent to [facility] ED for admission for AMS and generalized deconditioning. CT Head negative. Overnight patient had several BMs post lactulose enemas      Family wanted to pursue [facility] nursing home placement,    Interval History: Delirium episode. Now sedated    Past Medical History:   Diagnosis Date    Hypertension     Liver cirrhosis        History reviewed. No pertinent surgical history.    Review of patient's allergies indicates:  No Known Allergies  Family History       Problem Relation (Age of Onset)    Heart disease Mother          Tobacco Use    Smoking status: Former     Packs/day: 2.00     Years: 28.00     Pack years: 56.00     Types: Cigarettes    Smokeless tobacco: Never    Tobacco comments:     quit about 30 years ago    Substance and Sexual Activity    Alcohol use: No     Comment: quit about 10 years ago    Drug use: No    Sexual activity: Not on file     Review of Systems  Objective:     Vital Signs (Most Recent):  Temp: 97.7 °F (36.5 °C) (03/27/23 1115)  Pulse: 77 (03/27/23 1115)  Resp: 18 (03/27/23 1115)  BP: (!) 142/59 (03/27/23 1115)  SpO2: 98 % (03/27/23 1115)   Vital Signs (24h Range):  Temp:  [97.6 °F (36.4 °C)-98.3 °F (36.8 °C)] 97.7 °F (36.5 °C)  Pulse:  [69-85] 77  Resp:  [16-18] 18  SpO2:  [98 %-100 %] 98 %  BP: (115-142)/(57-89) 142/59     Weight: 63.2 kg (139 lb 5.3 oz) (03/26/23 0021)  Body mass index is 23.92 kg/m².    No  intake or output data in the 24 hours ending 03/27/23 1239    Lines/Drains/Airways       None                   Physical Exam    Significant Labs:  CBC: No results for input(s): WBC, HGB, HCT, PLT in the last 48 hours.  CMP:   Recent Labs   Lab 03/27/23  0835      CALCIUM 9.2   ALBUMIN 2.9*   PROT 6.2      K 4.1   CO2 24      BUN 12   CREATININE 1.2   ALKPHOS 136*   ALT 27   AST 45*   BILITOT 1.8*       Liver Function Test:   Recent Labs   Lab 03/26/23  0642 03/27/23  0835   ALT 24 27   AST 50* 45*   ALKPHOS 113 136*   BILITOT 1.1* 1.8*   PROT 5.3* 6.2   ALBUMIN 2.5* 2.9*         Significant Imaging:  NA    Assessment/Plan:     Active Diagnoses:    Diagnosis Date Noted POA    PRINCIPAL PROBLEM:  Acute hepatic encephalopathy [K76.82] 02/05/2023 Yes    Delirium [R41.0] 03/16/2023 Yes    Debility [R53.81] 02/19/2023 Yes    Hypokalemia [E87.6] 05/11/2022 Yes    Liver cirrhosis [K74.60]  Yes      Problems Resolved During this Admission:     Hepatic encephalopathy  Continue lactulose and rifaximin  Resolving slowing   BMs 2-3 semisolid per day as target  Correct hypokalemia as precipitating factor    Cirrhosis  MELD-Na score: 13 at 3/27/2023  8:35 AM  MELD score: 12 at 3/27/2023  8:35 AM  Calculated from:  Serum Creatinine: 1.2 mg/dL at 3/27/2023  8:35 AM  Serum Sodium: 136 mmol/L at 3/27/2023  8:35 AM  Total Bilirubin: 1.8 mg/dL at 3/27/2023  8:35 AM  INR(ratio): 1.2 at 3/27/2023  8:35 AM  Age: 72 years     Not a transplant candidate     Debility  family seeking intermediate NH placement  Anticipate discharge to [facility] on Monday      Delirium  -Per Primary team  Avoid sedatives      He has been accepted to [facility] for NH placement once medically stable.         Thank you for your consult. Hepatology will sign off. Please call back with questions    Zheng Mcclain MD  Gastroenterology  [facility]

## 2023-03-28 LAB
ALBUMIN SERPL BCP-MCNC: 2.5 G/DL (ref 3.5–5.2)
ALP SERPL-CCNC: 121 U/L (ref 55–135)
ALT SERPL W/O P-5'-P-CCNC: 22 U/L (ref 10–44)
ANION GAP SERPL CALC-SCNC: 7 MMOL/L (ref 8–16)
AST SERPL-CCNC: 43 U/L (ref 10–40)
BILIRUB SERPL-MCNC: 1.1 MG/DL (ref 0.1–1)
BUN SERPL-MCNC: 16 MG/DL (ref 8–23)
CALCIUM SERPL-MCNC: 8.9 MG/DL (ref 8.7–10.5)
CHLORIDE SERPL-SCNC: 108 MMOL/L (ref 95–110)
CO2 SERPL-SCNC: 23 MMOL/L (ref 23–29)
CREAT SERPL-MCNC: 1 MG/DL (ref 0.5–1.4)
EST. GFR  (NO RACE VARIABLE): >60 ML/MIN/1.73 M^2
GLUCOSE SERPL-MCNC: 96 MG/DL (ref 70–110)
MAGNESIUM SERPL-MCNC: 1.7 MG/DL (ref 1.6–2.6)
POTASSIUM SERPL-SCNC: 4.1 MMOL/L (ref 3.5–5.1)
PROT SERPL-MCNC: 5.5 G/DL (ref 6–8.4)
SODIUM SERPL-SCNC: 138 MMOL/L (ref 136–145)

## 2023-03-28 PROCEDURE — 25000003 PHARM REV CODE 250: Performed by: HOSPITALIST

## 2023-03-28 PROCEDURE — 11000001 HC ACUTE MED/SURG PRIVATE ROOM

## 2023-03-28 PROCEDURE — 25000003 PHARM REV CODE 250: Performed by: INTERNAL MEDICINE

## 2023-03-28 PROCEDURE — G0408 INPT/TELE FOLLOW UP 35: HCPCS | Mod: 95,,, | Performed by: PSYCHIATRY & NEUROLOGY

## 2023-03-28 PROCEDURE — G0408 PR TELHEALTH INPT CONSULT 35MIN: ICD-10-PCS | Mod: 95,,, | Performed by: PSYCHIATRY & NEUROLOGY

## 2023-03-28 PROCEDURE — 80053 COMPREHEN METABOLIC PANEL: CPT | Performed by: INTERNAL MEDICINE

## 2023-03-28 PROCEDURE — 36415 COLL VENOUS BLD VENIPUNCTURE: CPT | Performed by: INTERNAL MEDICINE

## 2023-03-28 PROCEDURE — 83735 ASSAY OF MAGNESIUM: CPT | Performed by: INTERNAL MEDICINE

## 2023-03-28 RX ORDER — OLANZAPINE 5 MG/1
5 TABLET ORAL NIGHTLY
Status: DISCONTINUED | OUTPATIENT
Start: 2023-03-28 | End: 2023-03-30

## 2023-03-28 RX ADMIN — OLANZAPINE 5 MG: 5 TABLET, FILM COATED ORAL at 09:03

## 2023-03-28 RX ADMIN — LACTULOSE 20 G: 20 SOLUTION ORAL at 03:03

## 2023-03-28 RX ADMIN — PROPRANOLOL HYDROCHLORIDE 10 MG: 10 TABLET ORAL at 09:03

## 2023-03-28 RX ADMIN — RIFAXIMIN 550 MG: 550 TABLET ORAL at 09:03

## 2023-03-28 RX ADMIN — LACTULOSE 20 G: 20 SOLUTION ORAL at 09:03

## 2023-03-28 NOTE — ASSESSMENT & PLAN NOTE
Family seeking [facility] NH placement.    3/23/23  He has been accepted to [facility] once medically stable  He will need TB and COVID tests prior to discharge (TB order placed)    3/27/23  Awaiting placement   23

## 2023-03-28 NOTE — PLAN OF CARE
Plan of care reviewed with patient with verbal understanding. Chart and orders reviewed.  Pt remains free from falls this shift, Safety precautions in place.   Pt currently resting comfortably in bed. Pain controlled with po and IV pain med (see mar for pain admin).Hourly rounding with bed in lowest position, side rails up, call light in reach.  Will continue to monitor until end of shift.

## 2023-03-28 NOTE — ASSESSMENT & PLAN NOTE
Pt has voided 125  cc pink tinged urine after joseph cath removed.  Pt tolerated cath.  Removal    Resolved

## 2023-03-28 NOTE — PLAN OF CARE
KUMAR spoke with Faustino at [facility] who stated she needed to speak with pt's nurse regarding medication and behavior. SW provided pt's RN with contact information.

## 2023-03-28 NOTE — SUBJECTIVE & OBJECTIVE
Review of Systems   All other systems reviewed and are negative.  Objective:     Vital Signs (Most Recent):  Temp: 98 °F (36.7 °C) (03/28/23 1706)  Pulse: 67 (03/28/23 1706)  Resp: 14 (03/28/23 1706)  BP: 134/66 (03/28/23 1706)  SpO2: 100 % (03/28/23 1706) Vital Signs (24h Range):  Temp:  [97.5 °F (36.4 °C)-98 °F (36.7 °C)] 98 °F (36.7 °C)  Pulse:  [63-81] 67  Resp:  [14-19] 14  SpO2:  [98 %-100 %] 100 %  BP: (113-134)/(59-66) 134/66     Weight: 61.6 kg (135 lb 12.9 oz)  Body mass index is 23.31 kg/m².  No intake or output data in the 24 hours ending 03/28/23 1712   Physical Exam  Constitutional:       General: He is not in acute distress.     Appearance: Normal appearance.   Cardiovascular:      Rate and Rhythm: Normal rate and regular rhythm.      Heart sounds: No murmur heard.  Pulmonary:      Effort: Pulmonary effort is normal. No respiratory distress.      Breath sounds: Normal breath sounds. No wheezing.   Abdominal:      General: There is no distension.      Palpations: Abdomen is soft.      Tenderness: There is no abdominal tenderness.   Neurological:      Mental Status: He is alert.       Significant Labs: All pertinent labs within the past 24 hours have been reviewed.  CBC: No results for input(s): WBC, HGB, HCT, PLT in the last 48 hours.  CMP:   Recent Labs   Lab 03/27/23  0835 03/28/23  0804    138   K 4.1 4.1    108   CO2 24 23    96   BUN 12 16   CREATININE 1.2 1.0   CALCIUM 9.2 8.9   PROT 6.2 5.5*   ALBUMIN 2.9* 2.5*   BILITOT 1.8* 1.1*   ALKPHOS 136* 121   AST 45* 43*   ALT 27 22   ANIONGAP 6* 7*       Significant Imaging: I have reviewed all pertinent imaging results/findings within the past 24 hours.

## 2023-03-28 NOTE — ASSESSMENT & PLAN NOTE
-Geodon as needed  -avoid benzodiazepines   -delirium precautions    Patient was seen by psychiatry, Dr. Figueredo, on 3/15/23 who recommended continuing duloxetine 30mg BID and depakote 250 mg PO BID for delirium.   -will consult Telepsych for direction  -currently on ziprasidone IM prn    3/3/23  Telepsych evaluated, Dr. Greene, and recommended sitter or tele sitter at all times, he does not have the capacity to refuse necessary medical care at this time, no standing psychotropic meds at this time, Haldol PRN, and EKG/Qtc if he receives Haldol.    3/24/23  Avoid sedatives  Continue Haldol prn    3/25/23  Improving     3/28/23  Re-consulted Tele-psychiatry today  Started on Zyprexa 5 mg qHS  PRN IM Haldol

## 2023-03-28 NOTE — CONSULTS
[facility]  Psychiatry  Telepsychiatry RE-Consult Note    Please see previous notes:    Patient agreeable to consultation via telepsychiatry.    Tele-Consultation from Psychiatry started: 3/28/2023 at 3:54pm,  The chief complaint leading to psychiatric consultation is: hyperactive delirium  This consultation was requested by , the medicine Department attending physician.  The location of the consulting psychiatrist is [address] [place] [address].  The patient location is  [facility] MEDICAL SURGICAL UNIT   The patient arrived at the ED at: several days ago    Also present with the patient at the time of the consultation:  Nursing    Patient Identification:   Patient information was obtained from patient, ER records, and primary team.  Patient presented voluntarily to the Emergency Department     DISCLAIMER: This note was prepared with [employer] voice recognition transcription software. Garbled syntax, mangled pronouns, and other bizarre constructions may be attributed to that software system      Consults  Teleconsult Time Documentation  Subjective:     History of Present Illness:  Regino Cowan is a 72 y.o. male.  With no known past psychiatric history other than alcohol use disorder presented to the emergency room on March 13th complaining of altered mental status secondary to hepatic encephalopathy.  Patient with past medical history of hypertension liver cirrhosis CKD.  On presentation patient's ammonia was elevated subsequently started on lactulose therapy to which she initially responded well to however patient continued to have delirium episodes of waxing and waning for which Psychiatry was consulted initially recommended to utilizing Geodon that was subsequently switched to Haldol for better response.  Today psychiatry was reconsulted to re-evaluate patient as he continues to have agitated episodes.    Upon talking to the primary team patient seems to be responding well to Haldol  "when he does receive a p.r.n. he has been particularly agitated and yelling at staff.  On exam today patient is seen sleeping in bed but awoke to voice command patient is hard of hearing which makes it difficult for him to communicate or understand providers that can also be a barrier that was noted.  Patient does not have any hearing aids which might be beneficial in reducing agitated episodes.  Patient is unaware which hospital he is at nor what month or year it is patient unsure of how long he has been in the hospital either.  Patient was complaining being hungry and wanted something to eat nursing at bedside suggested they can give him a sandwich however patient refused this and said he does not want a sandwich.  Patient continued to intermittently fall asleep but awoke to voice command with much irritability and was mostly uncooperative with the interview.  Patient likely cam ICU positive unable to fully test due to lack of cooperation on patient's behalf.    Psychiatric History:  Unable to assess due to patient's altered mental status please review 's note 5 days ago and Dr. Machuca note on admit      Psychiatric Mental Status Exam:  Arousal: lethargic  Sensorium/Orientation: oriented to  oriented only to person unable to recall despite reorientation  Behavior/Cooperation: reluctant to participate, uncooperative   Speech: normal tone, normal rate, normal pitch, normal volume  Language: not tested  Mood: " ok "   Affect: irritable and angry  Thought Process: blocked  Thought Content:  Unable to fully assess due to patient's lack of cooperation  Attention/Concentration:  Impaired patient unable to answer most questions in a linear format unsure if this is due to hearing impairment.  Memory:  Impaired likely due to encephalopathy  Insight: poor awareness of illness  Judgment: impaired due to encephalopathy      Past Medical History:   Past Medical History:   Diagnosis Date    Hypertension     Liver " cirrhosis       Laboratory Data:   Labs Reviewed   CBC W/ AUTO DIFFERENTIAL - Abnormal; Notable for the following components:       Result Value    RBC 4.41 (*)     MCH 32.0 (*)     All other components within normal limits   COMPREHENSIVE METABOLIC PANEL - Abnormal; Notable for the following components:    Sodium 135 (*)     CO2 22 (*)     Creatinine 2.2 (*)     Albumin 3.4 (*)     Total Bilirubin 1.7 (*)     eGFR 31 (*)     All other components within normal limits   URINALYSIS, REFLEX TO URINE CULTURE - Abnormal; Notable for the following components:    Ketones, UA Trace (*)     All other components within normal limits    Narrative:     Specimen Source->Urine   AMMONIA - Abnormal; Notable for the following components:    Ammonia 69 (*)     All other components within normal limits   MAGNESIUM   TROPONIN I   DRUG SCREEN PANEL, URINE EMERGENCY    Narrative:     Specimen Source->Urine   ALCOHOL,MEDICAL (ETHANOL)   CBC W/ AUTO DIFFERENTIAL   AMMONIA       Neurological History:  Unable to fully assess    Allergies:   Review of patient's allergies indicates:  No Known Allergies    Medications in ER:   Medications   aluminum-magnesium hydroxide-simethicone 200-200-20 mg/5 mL suspension 30 mL (has no administration in time range)   guaiFENesin 100 mg/5 ml syrup 200 mg (has no administration in time range)   rifAXIMin tablet 550 mg (550 mg Oral Not Given 3/28/23 0900)   propranoloL tablet 10 mg (10 mg Oral Not Given 3/28/23 0900)   folic acid tablet 1 mg (1 mg Oral Not Given 3/28/23 0900)   finasteride tablet 5 mg (5 mg Oral Not Given 3/28/23 0900)   thiamine tablet 100 mg (100 mg Oral Not Given 3/28/23 0900)   pantoprazole EC tablet 40 mg (40 mg Oral Not Given 3/28/23 0900)   lactulose 20 gram/30 mL solution Soln 20 g (20 g Oral Given 3/28/23 1522)   0.9%  NaCl infusion (0 mL/hr Intravenous Stopped 3/22/23 0615)   ziprasidone capsule 20 mg (20 mg Oral Given 3/21/23 2128)   tuberculin injection 5 Units (5 Units  Intradermal Given 3/23/23 1942)   potassium chloride SA CR tablet 40 mEq (40 mEq Oral Given 3/26/23 0016)       Medications at home:  Cymbalta and more recently Geodon for confusion    No new subjective & objective note has been filed under this hospital service since the last note was generated.      Assessment - Diagnosis - Goals:     Diagnosis/Impression:   Persistent delirium likely continued hepatic encephalopathy  Alcohol use disorder in partial remission  Rule out underlying neurocognitive disorder secondary to years of alcohol use in addition to recent hepatic encephalopathy    Rec:   PSYCH MEDS   SCHEDULED-Please start Zyprexa 5 mg p.o. q.h.s. at 9:00 p.m. for agitation in the evening time in addition to promoting better sleep therefore reducing delirium.      PRN- May continue to use Haldol 2 mg IV Q.6 P.R.N. FOR NON REDIRECTABLE AGITATION.  Per nursing patient seems to be responding well to the Haldol when he does receive it.    OTHER   Please obtain EKG and monitor QTC as patient is receiving several doses of IV Haldol which can prolong QTC if QTC is greater than 500  please hold all antipsychotics and reconsult Psychiatry    DELIRIUM BEHAVIOR MANAGEMENT  PLEASE utilize CHEMICAL restraints with PRN meds first for agitation. Minimize use of PHYSICAL restraints OR have periods of being out of physical restraints if possible.  Keep window shades open and room lit during day and room dim at night in order to promote normal sleep-wake cycles  Encourage family at bedside. [place] patient often to situation, location, date.  Continue to Limit or Discontinue use of Narcotics, Benzos and Anti-cholinergic medications as they may worsen delirium.  Continue medical workup for causative etiology of Delirium, please monitor ammonia patient refusing all p.o. meds which might be worsening his clinical condition and deteriorating his current medical symptoms.     More than 50% of the time was spent counseling/coordinating  care    Consulting clinician was informed of the encounter and consult note.    Consultation ended: 3/28/2023 at 4:35pm    Caroline Olmedo MD   Psychiatry  [facility]

## 2023-03-28 NOTE — PLAN OF CARE
Patient remains free of injury. VSS. Improved mental status, cooperative and following commands. Call light in reach, bed alarm on and avasys in use. Will continue to monitor.    Problem: Adult Inpatient Plan of Care  Goal: Patient-Specific Goal (Individualized)  Outcome: Ongoing, Progressing     Problem: Adult Inpatient Plan of Care  Goal: Absence of Hospital-Acquired Illness or Injury  Outcome: Ongoing, Progressing     Problem: Infection (Pneumonia)  Goal: Resolution of Infection Signs and Symptoms  Outcome: Ongoing, Progressing     Problem: Fall Injury Risk  Goal: Absence of Fall and Fall-Related Injury  Outcome: Ongoing, Progressing     Problem: Skin Injury Risk Increased  Goal: Skin Health and Integrity  Outcome: Ongoing, Progressing

## 2023-03-28 NOTE — PROGRESS NOTES
[facility] Medicine  Progress Note    Patient Name: Regino Cowan  MRN: [id number]  Patient Class: IP- Inpatient   Admission Date: 3/21/2023  Length of Stay: 6 days  Attending Physician: Samy King MD  Primary Care Provider: Rod Norman MD        Subjective:     Principal Problem:Acute hepatic encephalopathy        HPI:  Mr. Cowan is a 72-year-old  male with PMH significant for liver cirrhosis, was hospitalized at [facility] for hepatic encephalopathy, and discharged earlier this afternoon.  Patient was treated with lactulose, rifaximin.  Developed delirium during this hospitalization, required treatment with Haldol, Geodon per Psychiatry.  Family wanted to pursue detention nursing home placement, process was initiated.  However patient had hepatology appointment today 3/21/23 with Dr. Munoz, hence was discharged from [facility].  Dr. Munoz evaluated patient in the clinic, and was sent to [facility] ED for admission for AMS and generalized deconditioning.  Ammonia level elevated 65.  Received lactulose in the ED. No family at the bedside.       Overview/Hospital Course:  Regino Johnson is a 72 year old male who was admitted to [facility] for hepatic encephalopathy. Patient presented to ED with AMS in setting of elevated ammonia. CT Head negative. Administration of PO lactulose difficult due to patient's agitation so hepatology recommended lactulose enemas.    03/23/23  Telepsych evaluated as patient had behavioral issues yesterday, recommended sitter or tele sitter at all times, he has no capacity to refuse necessary medical care at this time, Haldol PRN for agitation, and EKG if he receives Haldol. Agitation may have been from increased ammonia level as he had not had a BM. Overnight patient had several BMs post lactulose enemas. More alert and responsive today. Calm and cooperative. Ammonia level 68 from 119.  He has been accepted to  [facility] for NH placement once medically stable.     3/24/23  Anticipate discharge to [facility] on Monday. Continue laxatives/Rifaximin for hepatic encephalopathy.     3/27/23  Reviewed overnight events. Haldol IM given. Patient awake, alert this morning, dressed in preperation to leave. NH requesting patient be out of restraints x 72 hours per  and nursing staff. Plan to d/c in AM if remains stable. Updated patient's daughter over phone.    3/28/23  Patient received dose of IM haldol again overnight due to reported agitation issues. Re-consulted tele-psychiatry, recommendations appreciated, started on Zyprexa 5 mg PO qHS. Patient resting in bed this afternoon, no new issues otherwise. Remains medically stable for transfer to SNF/NH.        Review of Systems   All other systems reviewed and are negative.  Objective:     Vital Signs (Most Recent):  Temp: 98 °F (36.7 °C) (03/28/23 1706)  Pulse: 67 (03/28/23 1706)  Resp: 14 (03/28/23 1706)  BP: 134/66 (03/28/23 1706)  SpO2: 100 % (03/28/23 1706) Vital Signs (24h Range):  Temp:  [97.5 °F (36.4 °C)-98 °F (36.7 °C)] 98 °F (36.7 °C)  Pulse:  [63-81] 67  Resp:  [14-19] 14  SpO2:  [98 %-100 %] 100 %  BP: (113-134)/(59-66) 134/66     Weight: 61.6 kg (135 lb 12.9 oz)  Body mass index is 23.31 kg/m².  No intake or output data in the 24 hours ending 03/28/23 1712   Physical Exam  Constitutional:       General: He is not in acute distress.     Appearance: Normal appearance.   Cardiovascular:      Rate and Rhythm: Normal rate and regular rhythm.      Heart sounds: No murmur heard.  Pulmonary:      Effort: Pulmonary effort is normal. No respiratory distress.      Breath sounds: Normal breath sounds. No wheezing.   Abdominal:      General: There is no distension.      Palpations: Abdomen is soft.      Tenderness: There is no abdominal tenderness.   Neurological:      Mental Status: He is alert.       Significant Labs: All  pertinent labs within the past 24 hours have been reviewed.  CBC: No results for input(s): WBC, HGB, HCT, PLT in the last 48 hours.  CMP:   Recent Labs   Lab 03/27/23  0835 03/28/23  0804    138   K 4.1 4.1    108   CO2 24 23    96   BUN 12 16   CREATININE 1.2 1.0   CALCIUM 9.2 8.9   PROT 6.2 5.5*   ALBUMIN 2.9* 2.5*   BILITOT 1.8* 1.1*   ALKPHOS 136* 121   AST 45* 43*   ALT 27 22   ANIONGAP 6* 7*       Significant Imaging: I have reviewed all pertinent imaging results/findings within the past 24 hours.      Assessment/Plan:      * Acute hepatic encephalopathy  -ammonia level elevated 65.    -lactulose 10 g p.o. t.i.d..    -repeat ammonia level in a.m..  -continue rifaximin    3/22/23  Ammonia higher today  Add lactulose enemas. Can discontinue once patient has bowel movements  Hepatology input appreciated  Continue management as above    3/23/23  Ammonia level improved to 68 post lactulose enemas  Continue Rifaximin  Monitor    3/24/22  Unable to get labs today  Continue lactulose enemas and Rifaximin    3/25/23  Improving  Patient is oriented and cooperative today    3/28/23  Resolved, continue lactulose and rifaximin    Delirium  -Geodon as needed  -avoid benzodiazepines   -delirium precautions    Patient was seen by psychiatry, Dr. Figueredo, on 3/15/23 who recommended continuing duloxetine 30mg BID and depakote 250 mg PO BID for delirium.   -will consult Telepsych for direction  -currently on ziprasidone IM prn    3/3/23  Telepsych evaluated, Dr. Greene, and recommended sitter or tele sitter at all times, he does not have the capacity to refuse necessary medical care at this time, no standing psychotropic meds at this time, Haldol PRN, and EKG/Qtc if he receives Haldol.    3/24/23  Avoid sedatives  Continue Haldol prn    3/25/23  Improving     3/28/23  Re-consulted Tele-psychiatry today  Started on Zyprexa 5 mg qHS  PRN IM Haldol    Liver cirrhosis  Followed by Dr. Munoz in the hepatology  clinic  Per GI recommendations from [facility], outpatient TIPS in the near future    Hypokalemia  Resolved    Debility  Family seeking group home NH placement.    3/23/23  He has been accepted to [facility] once medically stable  He will need TB and COVID tests prior to discharge (TB order placed)    3/27/23  Awaiting placement      VTE Risk Mitigation (From admission, onward)         Ordered     Place sequential compression device  Until discontinued         03/21/23 2059                Discharge Planning   CB: 3/27/2023     Code Status: Full Code   Is the patient medically ready for discharge?:     Reason for patient still in hospital (select all that apply): Pending disposition  Discharge Plan A: New Nursing Home placement - group home care facility                  Samy King MD  Department of Hospital Medicine   [facility] - Med Surg

## 2023-03-28 NOTE — ASSESSMENT & PLAN NOTE
-ammonia level elevated 65.    -lactulose 10 g p.o. t.i.d..    -repeat ammonia level in a.m..  -continue rifaximin    3/22/23  Ammonia higher today  Add lactulose enemas. Can discontinue once patient has bowel movements  Hepatology input appreciated  Continue management as above    3/23/23  Ammonia level improved to 68 post lactulose enemas  Continue Rifaximin  Monitor    3/24/22  Unable to get labs today  Continue lactulose enemas and Rifaximin    3/25/23  Improving  Patient is oriented and cooperative today    3/28/23  Resolved, continue lactulose and rifaximin

## 2023-03-29 LAB
ALBUMIN SERPL BCP-MCNC: 2.3 G/DL (ref 3.5–5.2)
ALP SERPL-CCNC: 86 U/L (ref 55–135)
ALT SERPL W/O P-5'-P-CCNC: 19 U/L (ref 10–44)
ANION GAP SERPL CALC-SCNC: 6 MMOL/L (ref 8–16)
AST SERPL-CCNC: 39 U/L (ref 10–40)
BILIRUB SERPL-MCNC: 1.1 MG/DL (ref 0.1–1)
BUN SERPL-MCNC: 13 MG/DL (ref 8–23)
CALCIUM SERPL-MCNC: 8.6 MG/DL (ref 8.7–10.5)
CHLORIDE SERPL-SCNC: 113 MMOL/L (ref 95–110)
CO2 SERPL-SCNC: 22 MMOL/L (ref 23–29)
CREAT SERPL-MCNC: 1 MG/DL (ref 0.5–1.4)
EST. GFR  (NO RACE VARIABLE): >60 ML/MIN/1.73 M^2
GLUCOSE SERPL-MCNC: 82 MG/DL (ref 70–110)
MAGNESIUM SERPL-MCNC: 1.6 MG/DL (ref 1.6–2.6)
POTASSIUM SERPL-SCNC: 3.6 MMOL/L (ref 3.5–5.1)
PROT SERPL-MCNC: 4.8 G/DL (ref 6–8.4)
SODIUM SERPL-SCNC: 141 MMOL/L (ref 136–145)

## 2023-03-29 PROCEDURE — 80053 COMPREHEN METABOLIC PANEL: CPT | Performed by: INTERNAL MEDICINE

## 2023-03-29 PROCEDURE — 25000003 PHARM REV CODE 250: Performed by: HOSPITALIST

## 2023-03-29 PROCEDURE — S0166 INJ OLANZAPINE 2.5MG: HCPCS | Performed by: NURSE PRACTITIONER

## 2023-03-29 PROCEDURE — 83735 ASSAY OF MAGNESIUM: CPT | Performed by: INTERNAL MEDICINE

## 2023-03-29 PROCEDURE — 36415 COLL VENOUS BLD VENIPUNCTURE: CPT | Performed by: INTERNAL MEDICINE

## 2023-03-29 PROCEDURE — 93010 ELECTROCARDIOGRAM REPORT: CPT | Mod: ,,, | Performed by: INTERNAL MEDICINE

## 2023-03-29 PROCEDURE — 25000003 PHARM REV CODE 250: Performed by: INTERNAL MEDICINE

## 2023-03-29 PROCEDURE — 11000001 HC ACUTE MED/SURG PRIVATE ROOM

## 2023-03-29 PROCEDURE — 93005 ELECTROCARDIOGRAM TRACING: CPT

## 2023-03-29 PROCEDURE — 25000003 PHARM REV CODE 250: Performed by: NURSE PRACTITIONER

## 2023-03-29 PROCEDURE — 93010 EKG 12-LEAD: ICD-10-PCS | Mod: ,,, | Performed by: INTERNAL MEDICINE

## 2023-03-29 RX ORDER — OLANZAPINE 10 MG/2ML
5 INJECTION, POWDER, FOR SOLUTION INTRAMUSCULAR ONCE AS NEEDED
Status: COMPLETED | OUTPATIENT
Start: 2023-03-29 | End: 2023-03-29

## 2023-03-29 RX ORDER — OLANZAPINE 5 MG/1
5 TABLET ORAL NIGHTLY
Qty: 30 TABLET | Refills: 11
Start: 2023-03-29 | End: 2023-03-31 | Stop reason: HOSPADM

## 2023-03-29 RX ORDER — POLYETHYLENE GLYCOL 3350 17 G/17G
17 POWDER, FOR SOLUTION ORAL DAILY
Status: DISCONTINUED | OUTPATIENT
Start: 2023-03-29 | End: 2023-04-01 | Stop reason: HOSPADM

## 2023-03-29 RX ADMIN — FOLIC ACID 1 MG: 1 TABLET ORAL at 12:03

## 2023-03-29 RX ADMIN — LACTULOSE 20 G: 20 SOLUTION ORAL at 12:03

## 2023-03-29 RX ADMIN — OLANZAPINE 5 MG: 10 INJECTION, POWDER, FOR SOLUTION INTRAMUSCULAR at 09:03

## 2023-03-29 RX ADMIN — OLANZAPINE 5 MG: 5 TABLET, FILM COATED ORAL at 12:03

## 2023-03-29 RX ADMIN — FINASTERIDE 5 MG: 5 TABLET, FILM COATED ORAL at 12:03

## 2023-03-29 RX ADMIN — PANTOPRAZOLE SODIUM 40 MG: 40 TABLET, DELAYED RELEASE ORAL at 12:03

## 2023-03-29 NOTE — PLAN OF CARE
KUMAR spoke with Faustino in admissions with [facility]. Faustino inquired about IV haldol use. KUMAR explained that per MAR, IV haldol discontinued on 3/27. Psych was re-consulted yesterday and medication adjustments were made.   Faustino to f/u with DON for continued review.  KUMAR to f/u.     KUMAR received call from pt's Juventino CASTAÑEDA, requesting to speak with MD regarding pt's current status. KUMAR sent secure to MD to f/u.   KUMAR to remain available.

## 2023-03-29 NOTE — ASSESSMENT & PLAN NOTE
Family seeking jail NH placement.    3/23/23  He has been accepted to [facility] once medically stable  He will need TB and COVID tests prior to discharge (TB order placed)    3/27/23  Awaiting placement

## 2023-03-29 NOTE — PLAN OF CARE
KUMAR spoke with Faustino, admissions coordinator with [facility] Faustino explained when they initially accepted pt's referral while at [facility], patient was not having this degree of behavioral challenges.Faustino states she understands that behaviors may be attributed to live issues, but explained that facility is unable to chemically or physically restrain patients. KUMAR explained, per report given in IDT rounds, patient did not required IV haldol or restraints overnight. Facility requested updated notes. Clinicals sent to facility via [facility]. Faustino and RONNI to review and f/u with KUMAR.

## 2023-03-29 NOTE — ASSESSMENT & PLAN NOTE
-ammonia level elevated 65.    -lactulose 10 g p.o. t.i.d..    -repeat ammonia level in a.m..  -continue rifaximin    3/22/23  Ammonia higher today  Add lactulose enemas. Can discontinue once patient has bowel movements  Hepatology input appreciated  Continue management as above    3/23/23  Ammonia level improved to 68 post lactulose enemas  Continue Rifaximin  Monitor    3/24/22  Unable to get labs today  Continue lactulose enemas and Rifaximin    3/25/23  Improving  Patient is oriented and cooperative today    3/28/23  Resolved, continue lactulose and rifaximin    3/29/23  Patient refused meds this AM, encourage to take  RN to re-attempt to give meds this afternoon  Overall, mental status improved since admission  Currently awake, alert, following commands, oriented to self, place

## 2023-03-29 NOTE — PROGRESS NOTES
[facility] [place] [facility]  Progress Note    Patient Name: Regino Cowan  MRN: [id number]  Patient Class: IP- Inpatient   Admission Date: 3/21/2023  Length of Stay: 7 days  Attending Physician: Samy King MD  Primary Care Provider: Rod Norman MD        Subjective:     Principal Problem:Acute hepatic encephalopathy        HPI:  Mr. Cowan is a 72-year-old  male with PMH significant for liver cirrhosis, was hospitalized at [facility] for hepatic encephalopathy, and discharged earlier this afternoon.  Patient was treated with lactulose, rifaximin.  Developed delirium during this hospitalization, required treatment with Haldol, Geodon per Psychiatry.  Family wanted to pursue long term nursing home placement, process was initiated.  However patient had hepatology appointment today 3/21/23 with Dr. Munoz, hence was discharged from [facility].  Dr. Munoz evaluated patient in the clinic, and was sent to [facility] ED for admission for AMS and generalized deconditioning.  Ammonia level elevated 65.  Received lactulose in the ED. No family at the bedside.       Overview/Hospital Course:  Regino Johnson is a 72 year old male who was admitted to [facility] for hepatic encephalopathy. Patient presented to ED with AMS in setting of elevated ammonia. CT Head negative. Administration of PO lactulose difficult due to patient's agitation so hepatology recommended lactulose enemas.    03/23/23  Telepsych evaluated as patient had behavioral issues yesterday, recommended sitter or tele sitter at all times, he has no capacity to refuse necessary medical care at this time, Haldol PRN for agitation, and EKG if he receives Haldol. Agitation may have been from increased ammonia level as he had not had a BM. Overnight patient had several BMs post lactulose enemas. More alert and responsive today. Calm and cooperative. Ammonia level 68 from 119.  He has been accepted to  [facility] for NH placement once medically stable.     3/24/23  Anticipate discharge to [facility] on Monday. Continue laxatives/Rifaximin for hepatic encephalopathy.     3/27/23  Reviewed overnight events. Haldol IM given. Patient awake, alert this morning, dressed in preperation to leave. NH requesting patient be out of restraints x 72 hours per  and nursing staff. Plan to d/c in AM if remains stable. Updated patient's daughter over phone.    3/28/23  Patient received dose of IM haldol again overnight due to reported agitation issues. Re-consulted tele-psychiatry, recommendations appreciated, started on Zyprexa 5 mg PO qHS. Patient resting in bed this afternoon, no new issues otherwise. Remains medically stable for transfer to SNF/NH.    3/29/23  NAEON. Started on zyprexa PO last night. Did not require IM haldol. Patient calm, awake, alert to self, place, situation. Patient refused morning meds, including lactulose. Encouraged patient to his medications as requested. Son-in-law at bedside, updated.        Review of Systems   All other systems reviewed and are negative.  Objective:     Vital Signs (Most Recent):  Temp: 98.3 °F (36.8 °C) (03/29/23 0809)  Pulse: 73 (03/29/23 0809)  Resp: 12 (03/29/23 0809)  BP: (!) 100/54 (03/29/23 0809)  SpO2: 97 % (03/29/23 0809)   Vital Signs (24h Range):  Temp:  [98 °F (36.7 °C)-98.9 °F (37.2 °C)] 98.3 °F (36.8 °C)  Pulse:  [67-75] 73  Resp:  [12-17] 12  SpO2:  [96 %-100 %] 97 %  BP: (100-134)/(54-66) 100/54     Weight: 64.3 kg (141 lb 12.1 oz)  Body mass index is 24.33 kg/m².    Intake/Output Summary (Last 24 hours) at 3/29/2023 1406  Last data filed at 3/29/2023 0007  Gross per 24 hour   Intake --   Output 2 ml   Net -2 ml      Physical Exam  Constitutional:       General: He is not in acute distress.     Appearance: Normal appearance.   Cardiovascular:      Rate and Rhythm: Normal rate and regular rhythm.      Heart  sounds: No murmur heard.  Pulmonary:      Effort: Pulmonary effort is normal. No respiratory distress.      Breath sounds: Normal breath sounds. No wheezing.   Abdominal:      General: There is no distension.      Palpations: Abdomen is soft.      Tenderness: There is no abdominal tenderness.   Neurological:      Mental Status: He is alert.       Significant Labs: All pertinent labs within the past 24 hours have been reviewed.  CBC: No results for input(s): WBC, HGB, HCT, PLT in the last 48 hours.  CMP:   Recent Labs   Lab 03/28/23  0804 03/29/23  0621    141   K 4.1 3.6    113*   CO2 23 22*   GLU 96 82   BUN 16 13   CREATININE 1.0 1.0   CALCIUM 8.9 8.6*   PROT 5.5* 4.8*   ALBUMIN 2.5* 2.3*   BILITOT 1.1* 1.1*   ALKPHOS 121 86   AST 43* 39   ALT 22 19   ANIONGAP 7* 6*       Significant Imaging: I have reviewed all pertinent imaging results/findings within the past 24 hours.      Assessment/Plan:      * Acute hepatic encephalopathy  -ammonia level elevated 65.    -lactulose 10 g p.o. t.i.d..    -repeat ammonia level in a.m..  -continue rifaximin    3/22/23  Ammonia higher today  Add lactulose enemas. Can discontinue once patient has bowel movements  Hepatology input appreciated  Continue management as above    3/23/23  Ammonia level improved to 68 post lactulose enemas  Continue Rifaximin  Monitor    3/24/22  Unable to get labs today  Continue lactulose enemas and Rifaximin    3/25/23  Improving  Patient is oriented and cooperative today    3/28/23  Resolved, continue lactulose and rifaximin    3/29/23  Patient refused meds this AM, encourage to take  RN to re-attempt to give meds this afternoon  Overall, mental status improved since admission  Currently awake, alert, following commands, oriented to self, place    Delirium  -Geodon as needed  -avoid benzodiazepines   -delirium precautions    Patient was seen by psychiatry, Dr. Figueredo, on 3/15/23 who recommended continuing duloxetine 30mg BID and depakote  250 mg PO BID for delirium.   -will consult Telepsych for direction  -currently on ziprasidone IM prn    3/3/23  Telepsych evaluated, Dr. Greene, and recommended sitter or tele sitter at all times, he does not have the capacity to refuse necessary medical care at this time, no standing psychotropic meds at this time, Haldol PRN, and EKG/Qtc if he receives Haldol.    3/24/23  Avoid sedatives  Continue Haldol prn    3/25/23  Improving     3/28/23  Re-consulted Tele-psychiatry today  Started on Zyprexa 5 mg qHS    3/29/23  No reported agitation, hallucinations  Continue Zyprexa 5 mg PO qHS    Liver cirrhosis  Followed by Dr. Munoz in the hepatology clinic  Per GI recommendations from [facility], outpatient TIPS in the near future    Hypokalemia  Resolved    Debility  Family seeking [facility].    3/23/23  He has been accepted to [facility] once medically stable  He will need TB and COVID tests prior to discharge (TB order placed)    3/27/23  Awaiting placement      VTE Risk Mitigation (From admission, onward)         Ordered     Place sequential compression device  Until discontinued         03/21/23 2059                Discharge Planning   CB: 3/29/2023     Code Status: Full Code   Is the patient medically ready for discharge?:     Reason for patient still in hospital (select all that apply): Pending disposition  Discharge Plan A: New Nursing Home placement - [facility]itentiary care facility                  Samy King MD  Department of Hospital Medicine   [facility] - Med Surg

## 2023-03-29 NOTE — SUBJECTIVE & OBJECTIVE
Review of Systems   All other systems reviewed and are negative.  Objective:     Vital Signs (Most Recent):  Temp: 98.3 °F (36.8 °C) (03/29/23 0809)  Pulse: 73 (03/29/23 0809)  Resp: 12 (03/29/23 0809)  BP: (!) 100/54 (03/29/23 0809)  SpO2: 97 % (03/29/23 0809)   Vital Signs (24h Range):  Temp:  [98 °F (36.7 °C)-98.9 °F (37.2 °C)] 98.3 °F (36.8 °C)  Pulse:  [67-75] 73  Resp:  [12-17] 12  SpO2:  [96 %-100 %] 97 %  BP: (100-134)/(54-66) 100/54     Weight: 64.3 kg (141 lb 12.1 oz)  Body mass index is 24.33 kg/m².    Intake/Output Summary (Last 24 hours) at 3/29/2023 1406  Last data filed at 3/29/2023 0007  Gross per 24 hour   Intake --   Output 2 ml   Net -2 ml      Physical Exam  Constitutional:       General: He is not in acute distress.     Appearance: Normal appearance.   Cardiovascular:      Rate and Rhythm: Normal rate and regular rhythm.      Heart sounds: No murmur heard.  Pulmonary:      Effort: Pulmonary effort is normal. No respiratory distress.      Breath sounds: Normal breath sounds. No wheezing.   Abdominal:      General: There is no distension.      Palpations: Abdomen is soft.      Tenderness: There is no abdominal tenderness.   Neurological:      Mental Status: He is alert.       Significant Labs: All pertinent labs within the past 24 hours have been reviewed.  CBC: No results for input(s): WBC, HGB, HCT, PLT in the last 48 hours.  CMP:   Recent Labs   Lab 03/28/23  0804 03/29/23  0621    141   K 4.1 3.6    113*   CO2 23 22*   GLU 96 82   BUN 16 13   CREATININE 1.0 1.0   CALCIUM 8.9 8.6*   PROT 5.5* 4.8*   ALBUMIN 2.5* 2.3*   BILITOT 1.1* 1.1*   ALKPHOS 121 86   AST 43* 39   ALT 22 19   ANIONGAP 7* 6*       Significant Imaging: I have reviewed all pertinent imaging results/findings within the past 24 hours.

## 2023-03-29 NOTE — PLAN OF CARE
KUMAR contacted [facility] regarding notice of discharge orders. KUMAR left message for Faustino, admissions coordinator, to f/u once she makes it in the office.     SW to f/u.

## 2023-03-29 NOTE — ASSESSMENT & PLAN NOTE
-Geodon as needed  -avoid benzodiazepines   -delirium precautions    Patient was seen by psychiatry, Dr. Figueredo, on 3/15/23 who recommended continuing duloxetine 30mg BID and depakote 250 mg PO BID for delirium.   -will consult Telepsych for direction  -currently on ziprasidone IM prn    3/3/23  Telepsych evaluated, Dr. Greene, and recommended sitter or tele sitter at all times, he does not have the capacity to refuse necessary medical care at this time, no standing psychotropic meds at this time, Haldol PRN, and EKG/Qtc if he receives Haldol.    3/24/23  Avoid sedatives  Continue Haldol prn    3/25/23  Improving     3/28/23  Re-consulted Tele-psychiatry today  Started on Zyprexa 5 mg qHS    3/29/23  No reported agitation, hallucinations  Continue Zyprexa 5 mg PO qHS

## 2023-03-29 NOTE — PROGRESS NOTES
Subjective:     Regino Cowan is here for initial visit for cirrhosis    History of Present Illness:  Regino Cowan is a 72-year-old male with known diagnosis of alcohol-related cirrhosis of liver, takes lactulose at home regularly but has stopped taking as he wanted to work apparently.  He was admitted at [facility] with change in mental status, was in and out of the hospital for the last 2 weeks, family had him discharged from the hospital and brought him to see me in the clinic yesterday.  He reportedly had tips placed more than 10 years ago in [place], reason family is not aware of.  Family says usually lactulose his mentation quickly improves but this time he is just not back to his baseline.  They are unable to care for him at home due to his violent behavior       is somewhat oriented, was able to give me details of his lactulose accurately, given me information of his hospital stay but clearly is disoriented off and on during whole visit.      No evidence of liver decompensation: no ascites, confusion or GI bleed    Review of Systems    Objective:     Physical Exam  Vitals reviewed.   Constitutional:       Appearance: Normal appearance. He is obese.      Comments: Sarcopenic    Eyes:      General: No scleral icterus.  Pulmonary:      Effort: Pulmonary effort is normal.   Abdominal:      General: Bowel sounds are normal. There is no distension.      Palpations: There is no mass.      Tenderness: There is no abdominal tenderness.   Musculoskeletal:      Right lower leg: No edema.      Left lower leg: No edema.   Skin:     Coloration: Skin is not jaundiced.   Neurological:      Mental Status: He is alert. He is disoriented.   Psychiatric:      Comments: Thought content is abnormal       MELD-Na score: 9 at 3/29/2023  6:21 AM  MELD score: 9 at 3/29/2023  6:21 AM  Calculated from:  Serum Creatinine: 1.0 mg/dL at 3/29/2023  6:21 AM  Serum Sodium: 141 mmol/L (Using max of 137  mmol/L) at 3/29/2023  6:21 AM  Total Bilirubin: 1.1 mg/dL at 3/29/2023  6:21 AM  INR(ratio): 1.2 at 3/27/2023  8:35 AM  Age: 72 years    WBC   Date Value Ref Range Status   03/22/2023 6.30 3.90 - 12.70 K/uL Final     Hemoglobin   Date Value Ref Range Status   03/22/2023 13.1 (L) 14.0 - 18.0 g/dL Final     Hematocrit   Date Value Ref Range Status   03/22/2023 36.9 (L) 40.0 - 54.0 % Final     Platelets   Date Value Ref Range Status   03/22/2023 164 150 - 450 K/uL Final     BUN   Date Value Ref Range Status   03/29/2023 13 8 - 23 mg/dL Final     Creatinine   Date Value Ref Range Status   03/29/2023 1.0 0.5 - 1.4 mg/dL Final     Glucose   Date Value Ref Range Status   03/29/2023 82 70 - 110 mg/dL Final     Calcium   Date Value Ref Range Status   03/29/2023 8.6 (L) 8.7 - 10.5 mg/dL Final     Sodium   Date Value Ref Range Status   03/29/2023 141 136 - 145 mmol/L Final     Potassium   Date Value Ref Range Status   03/29/2023 3.6 3.5 - 5.1 mmol/L Final     Chloride   Date Value Ref Range Status   03/29/2023 113 (H) 95 - 110 mmol/L Final     Magnesium   Date Value Ref Range Status   03/29/2023 1.6 1.6 - 2.6 mg/dL Final     AST   Date Value Ref Range Status   03/29/2023 39 10 - 40 U/L Final     ALT   Date Value Ref Range Status   03/29/2023 19 10 - 44 U/L Final     Alkaline Phosphatase   Date Value Ref Range Status   03/29/2023 86 55 - 135 U/L Final     Total Bilirubin   Date Value Ref Range Status   03/29/2023 1.1 (H) 0.1 - 1.0 mg/dL Final     Comment:     For infants and newborns, interpretation of results should be based  on gestational age, weight and in agreement with clinical  observations.    Premature Infant recommended reference ranges:  Up to 24 hours.............<8.0 mg/dL  Up to 48 hours............<12.0 mg/dL  3-5 days..................<15.0 mg/dL  6-29 days.................<15.0 mg/dL       Albumin   Date Value Ref Range Status   03/29/2023 2.3 (L) 3.5 - 5.2 g/dL Final     INR   Date Value Ref Range Status    03/27/2023 1.2 0.8 - 1.2 Final     Comment:     Coumadin Therapy:  2.0 - 3.0 for INR for all indicators except mechanical heart valves  and antiphospholipid syndromes which should use 2.5 - 3.5.           Assessment/Plan:     1. Acute metabolic encephalopathy    2. S/P TIPS (transjugular intrahepatic portosystemic shunt)      Cirrhosis of liver reportedly secondary to alcohol related liver disease, decompensated with hepatic encephalopathy.  Has history of tips placement, etiology unknown.  Clearly he now is disoriented, family unable to care for him at home, they also were not sure whether he was actually having multiple bowel movements per day in the hospital.  We will send him to the ED for admission, lactulose, we will obtain Doppler ultrasound of the abdomen to check for patency of the shunt.    RTC:  Post discharge from the hospital    I have reviewed existing labs, imaging. Educated daughter and son-in-law about disease process, prognosis and discussed treatment plan.       Hannah Munoz MD  Transplant Hepatologist  Dept of Hepatology, [place] [facility]

## 2023-03-29 NOTE — PLAN OF CARE
Discussed poc with pt, pt verbalized understanding    Purposeful rounding every 2hours    VS wnl  Fall precautions in place, telemonitor in room, remains injury free  Pt. Denies complain of pain      Accurate I&Os    Bed locked at lowest position  Call light within reach    Chart check complete  Will cont with POC

## 2023-03-29 NOTE — PLAN OF CARE
Pt refused morning medications by putting in mouth and spitting out at nurse. Pt has refused all medications except for 13:00 lactulose dose that pt took w/family present.  Physician notified. Pt out of bed several times during shift. Pt uncooperative with staff. Recorded vital signs stable but pt is refusing VS in afternoon rounds. Continued redirection by staff unsuccessful. Pt disoriented x 4.

## 2023-03-29 NOTE — PLAN OF CARE
KUMAR received call from RONNI Mcguire with [facility]. Shayla reviewed updated clinicals and advised that they'd like to see how pt does another night on Zyprexa. Shayla continued to explain importance of patient and staff safety at their facility and want to ensure that patient is more cooperative. KUMAR notified treatment team and requested nursing narratives to reflect pt's behaviors.     SW to f/u in AM.

## 2023-03-30 LAB
ALBUMIN SERPL BCP-MCNC: 2.5 G/DL (ref 3.5–5.2)
ALP SERPL-CCNC: 106 U/L (ref 55–135)
ALT SERPL W/O P-5'-P-CCNC: 22 U/L (ref 10–44)
AMMONIA PLAS-SCNC: 77 UMOL/L (ref 10–50)
ANION GAP SERPL CALC-SCNC: 7 MMOL/L (ref 8–16)
AST SERPL-CCNC: 41 U/L (ref 10–40)
BILIRUB SERPL-MCNC: 1 MG/DL (ref 0.1–1)
BUN SERPL-MCNC: 15 MG/DL (ref 8–23)
CALCIUM SERPL-MCNC: 8.9 MG/DL (ref 8.7–10.5)
CHLORIDE SERPL-SCNC: 109 MMOL/L (ref 95–110)
CO2 SERPL-SCNC: 23 MMOL/L (ref 23–29)
CREAT SERPL-MCNC: 1.2 MG/DL (ref 0.5–1.4)
EST. GFR  (NO RACE VARIABLE): >60 ML/MIN/1.73 M^2
GLUCOSE SERPL-MCNC: 102 MG/DL (ref 70–110)
MAGNESIUM SERPL-MCNC: 1.6 MG/DL (ref 1.6–2.6)
POTASSIUM SERPL-SCNC: 3.9 MMOL/L (ref 3.5–5.1)
PROT SERPL-MCNC: 5.4 G/DL (ref 6–8.4)
SODIUM SERPL-SCNC: 139 MMOL/L (ref 136–145)

## 2023-03-30 PROCEDURE — G0427 PR INPT TELEHEALTH CON 70/>M: ICD-10-PCS | Mod: 95,,, | Performed by: PSYCHIATRY & NEUROLOGY

## 2023-03-30 PROCEDURE — 36415 COLL VENOUS BLD VENIPUNCTURE: CPT | Performed by: INTERNAL MEDICINE

## 2023-03-30 PROCEDURE — 11000001 HC ACUTE MED/SURG PRIVATE ROOM

## 2023-03-30 PROCEDURE — 83735 ASSAY OF MAGNESIUM: CPT | Performed by: INTERNAL MEDICINE

## 2023-03-30 PROCEDURE — 99223 PR INITIAL HOSPITAL CARE,LEVL III: ICD-10-PCS | Mod: ,,,

## 2023-03-30 PROCEDURE — 99497 ADVNCD CARE PLAN 30 MIN: CPT | Mod: 25,,,

## 2023-03-30 PROCEDURE — G0427 INPT/ED TELECONSULT70: HCPCS | Mod: 95,,, | Performed by: PSYCHIATRY & NEUROLOGY

## 2023-03-30 PROCEDURE — 99223 1ST HOSP IP/OBS HIGH 75: CPT | Mod: ,,,

## 2023-03-30 PROCEDURE — 63600175 PHARM REV CODE 636 W HCPCS: Performed by: HOSPITALIST

## 2023-03-30 PROCEDURE — 25000003 PHARM REV CODE 250: Performed by: HOSPITALIST

## 2023-03-30 PROCEDURE — 80053 COMPREHEN METABOLIC PANEL: CPT | Performed by: INTERNAL MEDICINE

## 2023-03-30 PROCEDURE — 99497 PR ADVNCD CARE PLAN 30 MIN: ICD-10-PCS | Mod: 25,,,

## 2023-03-30 PROCEDURE — 82140 ASSAY OF AMMONIA: CPT | Performed by: HOSPITALIST

## 2023-03-30 PROCEDURE — 25000003 PHARM REV CODE 250: Performed by: INTERNAL MEDICINE

## 2023-03-30 PROCEDURE — 36415 COLL VENOUS BLD VENIPUNCTURE: CPT | Performed by: HOSPITALIST

## 2023-03-30 RX ORDER — HALOPERIDOL 5 MG/ML
5 INJECTION INTRAMUSCULAR EVERY 6 HOURS PRN
Status: DISCONTINUED | OUTPATIENT
Start: 2023-03-30 | End: 2023-03-30

## 2023-03-30 RX ORDER — HALOPERIDOL 5 MG/ML
2 INJECTION INTRAMUSCULAR EVERY 6 HOURS PRN
Status: DISCONTINUED | OUTPATIENT
Start: 2023-03-30 | End: 2023-04-01 | Stop reason: HOSPADM

## 2023-03-30 RX ORDER — OLANZAPINE 2.5 MG/1
2.5 TABLET ORAL DAILY
Status: DISCONTINUED | OUTPATIENT
Start: 2023-03-31 | End: 2023-04-01 | Stop reason: HOSPADM

## 2023-03-30 RX ADMIN — HALOPERIDOL LACTATE 5 MG: 5 INJECTION, SOLUTION INTRAMUSCULAR at 03:03

## 2023-03-30 RX ADMIN — OLANZAPINE 7.5 MG: 5 TABLET, FILM COATED ORAL at 10:03

## 2023-03-30 RX ADMIN — PROPRANOLOL HYDROCHLORIDE 10 MG: 10 TABLET ORAL at 10:03

## 2023-03-30 NOTE — PLAN OF CARE
Patient has remain aggressive and verbally abusive to staff throughout the shift, remains free of falls and injuries. VSS. No obvious s/sx of distress noted. Patient refused all medications this shift, provider is aware. Soft wrist restraints remain in place to bilateral upper extremities, documentation completed per protocol. Continue POC as ordered, chart check completed and all orders reviewed.

## 2023-03-30 NOTE — PLAN OF CARE
Nutrition recommendations 3/30:  1. Recommend pt continue on Cardiac diet as medically appropriate   2. Recommend Feeding assistance/ Encourage PO intake   3. When medically appropriate, recommend Suplena TID to assist filling nutritional gaps   4. When medically appropraite, recommend consider alternative means of nutrition   5. Recommend daily weights  6. Collaboration of care with medical providers     Goals:   1. Pt will tolerate and consume >60% EEN by RD follow up  2. Pt mentation will improve by RD follow up     Neha Lemon, Registration Eligible, Provisional LDN

## 2023-03-30 NOTE — PROGRESS NOTES
[facility] - Med Surg  Adult Nutrition  Progress Note    SUMMARY       Recommendations    Recommendation/Intervention:   1. Recommend pt continue on Cardiac diet as medically appropriate   2. Recommend Feeding assistance/ Encourage PO intake   3. When medically appropriate, recommend Suplena TID to assist filling nutritional gaps   4. When medically appropraite, recommend consider alternative means of nutrition   5. Recommend daily weights    Goals:   1. Pt will tolerate and consume >60% EEN by RD follow up  2. Pt mentation will improve by RD follow up   Nutrition Goal Status: new  Communication of RD Recs: other (comment)    Assessment and Plan    Nutrition Problem  Inadequate protein-energy intake    Related to (etiology):   Impaired cognitive ability     Signs and Symptoms (as evidenced by):   Encephalopathy  Delirium  Behavioral issues/ agitation   Increased ammonia labs: 77 (H)  Decreased PO intake: 0% x 2+ days d/t pt refusing      Interventions(treatment strategy):  1. Recommend pt continue on Cardiac diet as medically appropriate   2. Recommend Feeding assistance/ Encourage PO intake   3. When medically appropriate, recommend Suplena TID to assist filling nutritional gaps   4. When medically appropraite, recommend consider alternative means of nutrition   5. Recommend daily weights  6. Collaboration of care with medical providers     Nutrition Diagnosis Status:   New        Malnutrition Assessment  Malnutrition Type: acute illness or injury  Energy Intake: severe energy intake  Skin (Micronutrient): dry, bruised       Energy Intake (Malnutrition): less than or equal to 50% for greater than or equal to 5 days                         Reason for Assessment    Reason For Assessment: length of stay  Diagnosis: other (see comments) (Acute hepatic encephalopathy)  Relevant Medical History: Liver cirrhosis, HTN, Hypokalemia, Debility, Deliriuim  Hx: CKD 3a  General Information Comments:   3/30: 72 y.o. Male admitted for  "Acute hepatic encephalopathy. Pt noted to to have Delirium. Spoke to RN, confirmed Behavioral issues/ agitation, pt is refusing medications and food, pt is non compliant. RN reported pt is not experiencing any N/V/D, no abdominal pain/distention, no chewing/swallowing difficulties. Spoke to RN via secure chat, inquired about how long pt has been refusing food, RN stated "I only know for sure the last 2 days but not sure before then. When I had him on Sunday he was eating". Per Hospital Medicine Physician note 3/30 "...now in restraints and facemask applied as he was noted to be spitting on staff. Patient is awake, oriented to self only, confused; refusing medications; Patient not cooperative". NFPE not appropriate at this time d/t to pt AMS, agitation, non compliant, to be performed at RD follow up if appropriate. Reviewed chart: % PO intake charted: 0-50% x 15 days; LBM 3/28 (x 2 days no BM); Skin: dry, bruised; Mayank score: 17 (mild risk); Edema: None. Labs, meds, weight reviewed. Lab 3/25 Ammonia (H) 81, pending new lab results, 3/30 Total protein (L), Albumin (L), Anion gap (L), AST (H). Note thiamine, lactulose, olanzapine. Weight charted 3/21 139 lbs, 3/29 142 lbs, 142 lbs, + 3 lb wt gain x 8 days. RD will continue to monitor.  Nutrition Discharge Planning: Cardiac diet    Nutrition Risk Screen    Nutrition Risk Screen: no indicators present    Nutrition/Diet History    Food Allergies: NKFA  Factors Affecting Nutritional Intake: impaired cognitive status/motor control, other (see comments) (Refusing food)    Anthropometrics    Temp: 97.1 °F (36.2 °C)  Height: 5' 4" (162.6 cm)  Height (inches): 64 in  Weight Method: Bed Scale  Weight: 64.6 kg (142 lb 6.7 oz)  Weight (lb): 142.42 lb  Ideal Body Weight (IBW), Male: 130 lb  % Ideal Body Weight, Male (lb): 109.55 %  BMI (Calculated): 24.4  BMI Grade: 18.5-24.9 - normal     Wt Readings from Last 15 Encounters:   03/30/23 64.6 kg (142 lb 6.7 oz)   03/21/23 63 kg " (139 lb)   03/05/23 61.7 kg (136 lb 0.4 oz)   02/17/23 63.5 kg (140 lb)   02/16/23 62.8 kg (138 lb 8 oz)   02/05/23 60 kg (132 lb 4.4 oz)   05/11/22 67 kg (147 lb 11.3 oz)   12/21/21 72.3 kg (159 lb 6.3 oz)   07/27/21 63.5 kg (140 lb)   02/20/18 68.8 kg (151 lb 11.2 oz)   02/09/18 68.3 kg (150 lb 9.2 oz)   05/22/17 77 kg (169 lb 12.1 oz)   02/21/17 81.1 kg (178 lb 12.7 oz)     Lab/Procedures/Meds    Pertinent Labs Reviewed: reviewed  Pertinent Labs Comments: Ammonia (H) 81, Total protein (L), Albumin (L), Anion gap (L), AST (H)  Pertinent Medications Reviewed: reviewed  Pertinent Medications Comments: thiamine, lactulose, olanzapine  BMP  Lab Results   Component Value Date     03/30/2023    K 3.9 03/30/2023     03/30/2023    CO2 23 03/30/2023    BUN 15 03/30/2023    CREATININE 1.2 03/30/2023    CALCIUM 8.9 03/30/2023    ANIONGAP 7 (L) 03/30/2023    EGFRNORACEVR >60 03/30/2023      Lab Results   Component Value Date    ALBUMIN 2.5 (L) 03/30/2023      Lab Results   Component Value Date    ALT 22 03/30/2023    AST 41 (H) 03/30/2023    ALKPHOS 106 03/30/2023    BILITOT 1.0 03/30/2023   Scheduled Meds:   finasteride  5 mg Oral Daily    folic acid  1 mg Oral Daily    lactulose  20 g Oral TID    OLANZapine  5 mg Oral QHS    pantoprazole  40 mg Oral Daily    polyethylene glycol  17 g Oral Daily    propranoloL  10 mg Oral BID    rifAXIMin  550 mg Oral BID    thiamine  100 mg Oral Daily     Continuous Infusions:  PRN Meds:.aluminum-magnesium hydroxide-simethicone, guaiFENesin 100 mg/5 ml, haloperidol lactate   Physical Findings/Assessment         Estimated/Assessed Needs    Weight Used For Calorie Calculations: 64.6 kg (142 lb 6.7 oz)  Energy Calorie Requirements (kcal): 1562 kcals (MSJ x 1.2 AF (Liver disease)  Energy Need Method: Mary Alarcon  Protein Requirements: 38-52 g (0.6-0.8 g/kg ABW (Liver disease, during encephalopathy)  Weight Used For Protein Calculations: 64.6 kg (142 lb 6.7 oz)  Fluid  Requirements (mL): 1562 mL (1 mL/kcal)  Estimated Fluid Requirement Method: RDA Method  RDA Method (mL): 1562  CHO Requirement: 195 (1562 kcals/8)      Nutrition Prescription Ordered    Current Diet Order: Cardiac diet    Evaluation of Received Nutrient/Fluid Intake  I/O: (Net since admit)  3/30: +578.9 mL    Energy Calories Required: not meeting needs  Protein Required: not meeting needs  Fluid Required: not meeting needs  Tolerance: not tolerating  % Intake of Estimated Energy Needs: 0% (refusing, x 2+ days)  % Meal Intake: 0% (refusing, x 2+ days)    Nutrition Risk    Level of Risk/Frequency of Follow-up: high (F/u x 2 weekly)     Monitor and Evaluation    Food and Nutrient Intake: energy intake, food and beverage intake  Food and Nutrient Adminstration: diet order  Knowledge/Beliefs/Attitudes: food and nutrition knowledge/skill, beliefs and attitudes  Anthropometric Measurements: weight, weight change, body mass index  Biochemical Data, Medical Tests and Procedures: electrolyte and renal panel, gastrointestinal profile, glucose/endocrine profile, inflammatory profile, lipid profile     Nutrition Follow-Up    RD Follow-up?: Yes  Neha Lemon, Registration Eligible, Provisional LDN

## 2023-03-30 NOTE — NURSING
As nurse attempted to given IM injection patient began spitting at nurse, mouth covered and IM injection given. Mask applied to prevent further spitting to other staff. Charge nurse notified.
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
## 2023-03-30 NOTE — SUBJECTIVE & OBJECTIVE
Review of Systems   All other systems reviewed and are negative.  Objective:     Vital Signs (Most Recent):  Temp: 97.1 °F (36.2 °C) (03/30/23 1108)  Pulse: 66 (03/30/23 1103)  Resp: 15 (03/30/23 1103)  BP: (!) 147/70 (03/30/23 1103)  SpO2: 99 % (03/30/23 1103)   Vital Signs (24h Range):  Temp:  [97.1 °F (36.2 °C)-98.2 °F (36.8 °C)] 97.1 °F (36.2 °C)  Pulse:  [66-81] 66  Resp:  [15-18] 15  SpO2:  [95 %-100 %] 99 %  BP: (109-149)/() 147/70     Weight: 64.6 kg (142 lb 6.7 oz)  Body mass index is 24.45 kg/m².    Intake/Output Summary (Last 24 hours) at 3/30/2023 1318  Last data filed at 3/29/2023 1905  Gross per 24 hour   Intake --   Output 2 ml   Net -2 ml      Physical Exam  Constitutional:       General: He is not in acute distress.     Appearance: Normal appearance. He is ill-appearing.      Comments: In bilateral soft wrist restraints   Cardiovascular:      Rate and Rhythm: Normal rate and regular rhythm.      Heart sounds: No murmur heard.  Pulmonary:      Effort: Pulmonary effort is normal. No respiratory distress.      Breath sounds: Normal breath sounds. No wheezing.   Abdominal:      General: There is no distension.      Palpations: Abdomen is soft.      Tenderness: There is no abdominal tenderness.   Neurological:      Mental Status: He is alert.       Significant Labs: All pertinent labs within the past 24 hours have been reviewed.  CBC: No results for input(s): WBC, HGB, HCT, PLT in the last 48 hours.  CMP:   Recent Labs   Lab 03/29/23  0621 03/30/23  0659    139   K 3.6 3.9   * 109   CO2 22* 23   GLU 82 102   BUN 13 15   CREATININE 1.0 1.2   CALCIUM 8.6* 8.9   PROT 4.8* 5.4*   ALBUMIN 2.3* 2.5*   BILITOT 1.1* 1.0   ALKPHOS 86 106   AST 39 41*   ALT 19 22   ANIONGAP 6* 7*       Significant Imaging: I have reviewed all pertinent imaging results/findings within the past 24 hours.
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
## 2023-03-30 NOTE — CONSULTS
"Advance Care Planning    Consult Note  Palliative Medicine      Consult Requested By: Samy King MD  Reason for Consult: Goals of care and Advance care planning    SUBJECTIVE:     History of Present Illness:  Regino Cowan is a 72 y.o. year old  male with a history of alcohol-related cirrhosis, hypertension, and cognitive decline (NYD). He is S/P [facility] in [place], and followed by Dr. Munoz. He presented to ED on 3/21/2023 with altered mental status following his appointment with Dr. Munoz. CT head negative, and ammonia elevated. He was admitted for acute hepatic encephalopathy.     Course In Hospital: He is being treated with laxatives and rifaximin for hepatic encephalopathy. He is confused, intermittently agitated, refusing medications, and combative towards staff. Psych is following, and have recommended Haldol PRN and Zyprexa. He had been accepted to [facility] for NH placement once medically stable.     Palliative care encounter: The palliative care team was consulted regarding advance care planning and goals of care discussion. Mr. Cowan is currently confused, and alert to himself only. He was seen sitting in bed with two-point restraints applied. He stated that he is in a [employer] and he "is trapped, and want to get out of here". I attempted to introduce myself and explain the role of palliative care team in his care. However, he declined answering questions, and did not seem to understand the nature of the discussion.   I had a phone conversation with patient's daughter, Eleanor Hamlin ([phone]) regarding the patient. I explained to Eleanor that patient is clinically declining, refusing his medications, combative towards staff, and requiring physical restraints. I explained the role of the palliative care department in caring for seriously ill patients in addition to facilitating advance care planning and goals of care discussions.   She stated that she recognized that " "the patient's condition has been deteriorating clinically and cognitively. She stated that though patient lived alone, she was paying frequent visits to his house to assist with "changing the channel and finding the remote". Furthermore, Eleanor stated that "this (patient's cognitive decline) has been going on for a while, but he was managing". Eleanor stated that she knows that patient's condition is "pretty bad", but she is currently unable to care for her father, and wonders if he is hospice-appropriate. Eleanor stated that she wants patient to have "24 hour care" following his discharge from hospital, but knows that the nursing home will not accept him in an agitated and combative state.  I explained to Eleanor that patient is inpatient hospice-appropriate given his cognitive and clinical decline. Eleanor agreed. Given that patient is unable to appoint an HCPOA due to altered mental status, I recommended a family meeting with pt's son, Aleks Cowan ([phone]), and pt's siblings who all live in [place]. Eleanor agreed to the family meeting, and stated she thinks that her family would agree that patient is hospice-appropriate. She stated that patient "wanted to be DNR" at [facility]. However, LaPOST was not completed.  Eleanor was informed that DNR must be signed to transfer patient to inpatient hospice.     Palliative care team has scheduled a phone family meeting with Eleanor and pt's family on Friday, March 31st, 2023 at 2pm to finalize goals of care discussion and possibly initiate patient's hospice transfer.     Past Medical History:   Diagnosis Date    Hypertension     Liver cirrhosis      History reviewed. No pertinent surgical history.  Family History   Problem Relation Age of Onset    Heart disease Mother        Social History     Socioeconomic History    Marital status:    Tobacco Use    Smoking status: Former     Packs/day: 2.00     Years: 28.00     Pack years: 56.00     Types: Cigarettes    " Smokeless tobacco: Never    Tobacco comments:     quit about 30 years ago    Substance and Sexual Activity    Alcohol use: No     Comment: quit about 10 years ago    Drug use: No     Social Determinants of Health     Financial Resource Strain: Low Risk     Difficulty of Paying Living Expenses: Not hard at all   Food Insecurity: No Food Insecurity    Worried About Running Out of Food in the Last Year: Never true    Ran Out of Food in the Last Year: Never true   Transportation Needs: No Transportation Needs    Lack of Transportation (Medical): No    Lack of Transportation (Non-Medical): No   Physical Activity: Inactive    Days of Exercise per Week: 0 days    Minutes of Exercise per Session: 0 min   Stress: No Stress Concern Present    Feeling of Stress : Not at all   Social Connections: Moderately Isolated    Frequency of Communication with Friends and Family: More than three times a week    Frequency of Social Gatherings with Friends and Family: More than three times a week    Attends Christianity Services: More than 4 times per year    Active Member of Clubs or Organizations: No    Attends Club or Organization Meetings: Never    Marital Status:    Housing Stability: Low Risk     Unable to Pay for Housing in the Last Year: No    Number of Places Lived in the Last Year: 1    Unstable Housing in the Last Year: No      Review of patient's allergies indicates:  No Known Allergies    Medications:    Current Facility-Administered Medications:     aluminum-magnesium hydroxide-simethicone 200-200-20 mg/5 mL suspension 30 mL, 30 mL, Oral, Q6H PRN, Loy Olivo MD    finasteride tablet 5 mg, 5 mg, Oral, Daily, Loy Olivo MD, 5 mg at 03/29/23 1248    folic acid tablet 1 mg, 1 mg, Oral, Daily, Loy Olivo MD, 1 mg at 03/29/23 1248    guaiFENesin 100 mg/5 ml syrup 200 mg, 200 mg, Oral, Q4H PRN, Loy Olivo MD    haloperidol lactate injection 5 mg, 5 mg, Intramuscular, Q6H PRN, Samy King MD    lactulose 20 gram/30  mL solution Soln 20 g, 20 g, Oral, TID, Samy iKng MD, 20 g at 03/29/23 1246    OLANZapine tablet 5 mg, 5 mg, Oral, QHS, Samy King MD, 5 mg at 03/29/23 1252    pantoprazole EC tablet 40 mg, 40 mg, Oral, Daily, Loy Olivo MD, 40 mg at 03/29/23 1247    polyethylene glycol packet 17 g, 17 g, Oral, Daily, Samy King MD    propranoloL tablet 10 mg, 10 mg, Oral, BID, Loy Olivo MD, 10 mg at 03/28/23 2142    rifAXIMin tablet 550 mg, 550 mg, Oral, BID, Loy Olivo MD, 550 mg at 03/28/23 2142    thiamine tablet 100 mg, 100 mg, Oral, Daily, Loy Olivo MD, 100 mg at 03/26/23 1015    ROS:  Review of Systems   Unable to perform ROS: Mental status change (Pt is currently confused and refusing to answer questions)     OBJECTIVE:     Physical Exam:  Vitals: Temp: 97.1 °F (36.2 °C) (03/30/23 1108)  Pulse: 66 (03/30/23 1103)  Resp: 15 (03/30/23 1103)  BP: (!) 147/70 (03/30/23 1103)  SpO2: 99 % (03/30/23 1103)    Physical Exam  Vitals (Limited as pt refused) and nursing note reviewed.   Constitutional:       Appearance: He is ill-appearing.      Comments: Limited PE as   pt did not want to be touched with stethoscope.   HENT:      Head: Normocephalic.   Eyes:      General: No scleral icterus.     Conjunctiva/sclera: Conjunctivae normal.      Pupils: Pupils are equal, round, and reactive to light.   Cardiovascular:      Pulses: Normal pulses.      Comments: Pt declined cardiac exam. He stated he did not want to be touched with stethoscope.  Pulmonary:      Effort: Pulmonary effort is normal. No respiratory distress.   Abdominal:      General: There is no distension.      Comments: Not completed as patient stated he did not want to be touched with stethoscope.   Musculoskeletal:      Cervical back: Normal range of motion.      Comments: Bilateral wrist restraints applied.   Skin:     General: Skin is dry.      Coloration: Skin is not jaundiced.      Findings: Bruising (left hand and left forearm) present.    Neurological:      Mental Status: He is alert. He is disoriented.      Comments: Patient is only alert to himself. He stated he is in a Bahai.    Psychiatric:      Comments: Pt is confused and refusing to answer questions.          Review of Symptoms      Symptom Assessment (ESAS 0-10 Scale)  Pain:  0  Dyspnea:  0  Anxiety:  0  Nausea:  0  Depression:  0  Anorexia:  0  Fatigue:  0  Insomnia:  0  Restlessness:  0  Agitation:  0  Unable to complete assessment due to Mental status change     CAM / Delirium:  Positive  Constipation: unable to assess due to pt's confusion.  Diarrhea: unable to assess due to pt's confusion.      Pain Assessment in Advanced Demential Scale (PAINAD)   Breathing - Independent of vocalization:  0  Negative vocalization:  0  Facial expression:  0  Body language:  1  Consolability:  1  Total:  2    Performance Status:  30    ECOG Performance Status rdGrdrrdarddrderd:rd rd3rd Living Arrangements:  Lives alone    Psychosocial/Cultural:   See Palliative Psychosocial Note: Yes  Patient lived alone prior to this admission. There is no signed advance directive document. Daughter, Eleanor Hamlin ([phone]) is the primary . Patient also has a son, Aleks Cowan ([phone]) who lives in [place].  **Primary  to Follow**  Palliative Care  Consult: No    Spiritual:  F - Analia and Belief:  Patient stated that he is [employer].     Time-Based Charting:  Yes  Chart Review: 40 minutes  Face to Face: 20 minutes  Advance Care Plannin minutes    Total Time Spent: 80 minutes    Advance Directives:   Living Will: No        Oral Declaration: No    LaPOST: No    Do Not Resuscitate Status: No    Medical Power of : No        Oral Declaration: No      Decision Making:  Patient unable to communicate due to disease severity/cognitive impairment and Family answered questions (Per phone conversation with patient's daughterEleanor on 2023)  Goals of Care: What is most  important right now is to focus on symptom/pain control, improvement in condition but with limits to invasive therapies, comfort and QOL . Accordingly, we have decided that the best plan to meet the patient's goals includes continuing with treatment, with plans to further discuss goals of care on March 31st, 2023 at 2pm.     Labs:  WBC   Date Value Ref Range Status   03/22/2023 6.30 3.90 - 12.70 K/uL Final       Hemoglobin   Date Value Ref Range Status   03/22/2023 13.1 (L) 14.0 - 18.0 g/dL Final       Hematocrit   Date Value Ref Range Status   03/22/2023 36.9 (L) 40.0 - 54.0 % Final       MCV   Date Value Ref Range Status   03/22/2023 89 82 - 98 fL Final       Platelets   Date Value Ref Range Status   03/22/2023 164 150 - 450 K/uL Final       BMP  Lab Results   Component Value Date     03/30/2023    K 3.9 03/30/2023     03/30/2023    CO2 23 03/30/2023    BUN 15 03/30/2023    CREATININE 1.2 03/30/2023    CALCIUM 8.9 03/30/2023    ANIONGAP 7 (L) 03/30/2023    EGFRNORACEVR >60 03/30/2023       Lab Results   Component Value Date    AST 41 (H) 03/30/2023    ALKPHOS 106 03/30/2023    BILITOT 1.0 03/30/2023       Albumin   Date Value Ref Range Status   03/30/2023 2.5 (L) 3.5 - 5.2 g/dL Final       Radiology:I have reviewed all pertinent imaging results/findings within the past 24 hours.      ASSESSMENT   Alcohol-related Hepatic encephalopathy/Delirium/Cognitive Decline  Advanced Care Planning/Goals of Care Discussion    PLAN   Alcohol-related Hepatic encephalopathy/Delirium/Cognitive Decline  Patient is being treated with Rifaximin and lactulose. However, he remains confused. Admitting team and Psych managing.  Discussed with Dr. King and pt's daughter, Eleanor that pt's delirium and cognitive decline is likely multi-factorial in the setting of history of alcohol misuse, hepatic encephalopathy, and possible dementia.   Informed pt's daughter that a comprehensive neurological evaluation may be difficult to  conduct due to pt's confusion.  Explained to pt's daughter that though pt is refusing medications, inserting an NG tube for feeding and medication administration is not advisable, as pt is at risk for aspiration and injury if he forcefully removes it.    Advanced Care Planning/Goals of Care Discussion  Pt's daughter is opting for pt to be discharged to inpatient hospice near [facility]. She stated she believes the rest of pt's family will agree.   Will continue discussion during phone family meeting on March 31, 2023 at 2pm.    Discussed case and visit details with Dr. King.     Thank you for allowing Palliative Medicine to be involved in the care of [place] Cowan.         Medical decision making: poor prognosis management of more than one chronic illness in exacerbation or progression of disease    Plan required increased review of medication choice, interaction, dosing, frequency, and route due to patient complexity. Patient complexity increased by: altered mentation and at risk for delirium      60 min time spent on consultation.  20 min ACP time spent discussing: code status, assessed patient specific goals and addressed the best way to achieve them, coordination of care and emotional support, formulating and communicating prognosis, exploring burden/ benefit of various approaches of treatment, inquired about existing or willingness to complete advance directive documents.        ANNIE MELLO NP  Palliative Medicine

## 2023-03-30 NOTE — PLAN OF CARE
Discussed poc with pt and daughter via phone, pt. Refuses to receive information, daughter verbalizes understanding.   Pt. Refuses all medication  Pt. Was place in soft restrains and remained restless through the night    Purposeful rounding every 2hours    VS wnl  Fall precautions in place, remains injury free      Bed locked at lowest position  Call light within reach    Chart check complete  Will cont with POC

## 2023-03-30 NOTE — PROGRESS NOTES
[facility] Medicine  Progress Note    Patient Name: Regino Cowan  MRN: [id number]  Patient Class: IP- Inpatient   Admission Date: 3/21/2023  Length of Stay: 8 days  Attending Physician: Samy King MD  Primary Care Provider: Rod Norman MD        Subjective:     Principal Problem:Acute hepatic encephalopathy        HPI:  Mr. Cowan is a 72-year-old  male with PMH significant for liver cirrhosis, was hospitalized at [facility] for hepatic encephalopathy, and discharged earlier this afternoon.  Patient was treated with lactulose, rifaximin.  Developed delirium during this hospitalization, required treatment with Haldol, Geodon per Psychiatry.  Family wanted to pursue [facility] nursing home placement, process was initiated.  However patient had hepatology appointment today 3/21/23 with Dr. Munoz, hence was discharged from [facility].  Dr. Munoz evaluated patient in the clinic, and was sent to [facility] ED for admission for AMS and generalized deconditioning.  Ammonia level elevated 65.  Received lactulose in the ED. No family at the bedside.       Overview/Hospital Course:  Regino Johnson is a 72 year old male who was admitted to [facility] for hepatic encephalopathy. Patient presented to ED with AMS in setting of elevated ammonia. CT Head negative. Administration of PO lactulose difficult due to patient's agitation so hepatology recommended lactulose enemas.    03/23/23  Telepsych evaluated as patient had behavioral issues yesterday, recommended sitter or tele sitter at all times, he has no capacity to refuse necessary medical care at this time, Haldol PRN for agitation, and EKG if he receives Haldol. Agitation may have been from increased ammonia level as he had not had a BM. Overnight patient had several BMs post lactulose enemas. More alert and responsive today. Calm and cooperative. Ammonia level 68 from 119.  He has been accepted to  [facility] for NH placement once medically stable.     3/24/23  Anticipate discharge to [facility] on Monday. Continue laxatives/Rifaximin for hepatic encephalopathy.     3/27/23  Reviewed overnight events. Haldol IM given. Patient awake, alert this morning, dressed in preperation to leave. NH requesting patient be out of restraints x 72 hours per  and nursing staff. Plan to d/c in AM if remains stable. Updated patient's daughter over phone.    3/28/23  Patient received dose of IM haldol again overnight due to reported agitation issues. Re-consulted tele-psychiatry, recommendations appreciated, started on Zyprexa 5 mg PO qHS. Patient resting in bed this afternoon, no new issues otherwise. Remains medically stable for transfer to SNF/NH.    3/29/23  NAEON. Started on zyprexa PO last night. Did not require IM haldol. Patient calm, awake, alert to self, place, situation. Patient refused morning meds, including lactulose. Encouraged patient to his medications as requested. Son-in-law at bedside, updated.    3/30/23  Overnight events reviewed, now in restraints and facemask applied as he was noted to be spitting on staff. Patient is awake, oriented to self only, confused. Palliative Care to see today, appreciate assistance.        Review of Systems   All other systems reviewed and are negative.  Objective:     Vital Signs (Most Recent):  Temp: 97.1 °F (36.2 °C) (03/30/23 1108)  Pulse: 66 (03/30/23 1103)  Resp: 15 (03/30/23 1103)  BP: (!) 147/70 (03/30/23 1103)  SpO2: 99 % (03/30/23 1103)   Vital Signs (24h Range):  Temp:  [97.1 °F (36.2 °C)-98.2 °F (36.8 °C)] 97.1 °F (36.2 °C)  Pulse:  [66-81] 66  Resp:  [15-18] 15  SpO2:  [95 %-100 %] 99 %  BP: (109-149)/() 147/70     Weight: 64.6 kg (142 lb 6.7 oz)  Body mass index is 24.45 kg/m².    Intake/Output Summary (Last 24 hours) at 3/30/2023 1318  Last data filed at 3/29/2023 1905  Gross per 24 hour   Intake --    Output 2 ml   Net -2 ml      Physical Exam  Constitutional:       General: He is not in acute distress.     Appearance: Normal appearance. He is ill-appearing.      Comments: In bilateral soft wrist restraints   Cardiovascular:      Rate and Rhythm: Normal rate and regular rhythm.      Heart sounds: No murmur heard.  Pulmonary:      Effort: Pulmonary effort is normal. No respiratory distress.      Breath sounds: Normal breath sounds. No wheezing.   Abdominal:      General: There is no distension.      Palpations: Abdomen is soft.      Tenderness: There is no abdominal tenderness.   Neurological:      Mental Status: He is alert.       Significant Labs: All pertinent labs within the past 24 hours have been reviewed.  CBC: No results for input(s): WBC, HGB, HCT, PLT in the last 48 hours.  CMP:   Recent Labs   Lab 03/29/23  0621 03/30/23  0659    139   K 3.6 3.9   * 109   CO2 22* 23   GLU 82 102   BUN 13 15   CREATININE 1.0 1.2   CALCIUM 8.6* 8.9   PROT 4.8* 5.4*   ALBUMIN 2.3* 2.5*   BILITOT 1.1* 1.0   ALKPHOS 86 106   AST 39 41*   ALT 19 22   ANIONGAP 6* 7*       Significant Imaging: I have reviewed all pertinent imaging results/findings within the past 24 hours.      Assessment/Plan:      * Acute hepatic encephalopathy  -ammonia level elevated 65.    -lactulose 10 g p.o. t.i.d..    -repeat ammonia level in a.m..  -continue rifaximin    3/22/23  Ammonia higher today  Add lactulose enemas. Can discontinue once patient has bowel movements  Hepatology input appreciated  Continue management as above    3/23/23  Ammonia level improved to 68 post lactulose enemas  Continue Rifaximin  Monitor    3/24/22  Unable to get labs today  Continue lactulose enemas and Rifaximin    3/25/23  Improving  Patient is oriented and cooperative today    3/28/23  Continue lactulose and rifaximin    3/29/23  Patient refused meds this AM, encourage to take  RN to re-attempt to give meds this afternoon  Overall, mental status  improved since admission  Currently awake, alert, following commands, oriented to self, place    3/30/23  Placed in restraints and facemask applied overnight  Patient is awake, oriented to self; refusing medications  Patient not cooperative, reviewed events  Palliative Care consulted for [facility], appreciate assistance    Delirium  -Geodon as needed  -avoid benzodiazepines   -delirium precautions    Patient was seen by psychiatry, Dr. Figueredo, on 3/15/23 who recommended continuing duloxetine 30mg BID and depakote 250 mg PO BID for delirium.   -will consult Telepsych for direction  -currently on ziprasidone IM prn    3/3/23  Telepsych evaluated, Dr. Greene, and recommended sitter or tele sitter at all times, he does not have the capacity to refuse necessary medical care at this time, no standing psychotropic meds at this time, Haldol PRN, and EKG/Qtc if he receives Haldol.    3/24/23  Avoid sedatives  Continue Haldol prn    3/25/23  Improving     3/28/23  Re-consulted Tele-psychiatry today  Started on Zyprexa 5 mg qHS    3/29/23  No reported agitation, hallucinations  Continue Zyprexa 5 mg PO qHS    3/30/23  Now in restraints  Psychiatry consult    Liver cirrhosis  Followed by Dr. Munoz in the hepatology clinic  Per GI recommendations from S , outpatient [facility]IPS in the near future    Hypokalemia  Resolved    Debility  Family seeking snf NH placement.    3/23/23  He has been accepted to [facility] once medically stable  He will need TB and COVID tests prior to discharge (TB order placed)    3/27/23  Awaiting placement    3/30/23  Pending improvement with agitation for placement      VTE Risk Mitigation (From admission, onward)         Ordered     Place sequential compression device  Until discontinued         03/21/23 2059                Discharge Planning   CB: 3/29/2023     Code Status: Full Code   Is the patient medically ready for discharge?:     Reason for patient still in hospital (select  all that apply): Patient trending condition, Treatment and Consult recommendations  Discharge Plan A: New Nursing Home placement - long-term care facility                  Samy King MD  Department of Hospital Medicine   '[place] [facility]

## 2023-03-30 NOTE — ASSESSMENT & PLAN NOTE
-Geodon as needed  -avoid benzodiazepines   -delirium precautions    Patient was seen by psychiatry, Dr. Figueredo, on 3/15/23 who recommended continuing duloxetine 30mg BID and depakote 250 mg PO BID for delirium.   -will consult Telepsych for direction  -currently on ziprasidone IM prn    3/3/23  Telepsych evaluated, Dr. Greene, and recommended sitter or tele sitter at all times, he does not have the capacity to refuse necessary medical care at this time, no standing psychotropic meds at this time, Haldol PRN, and EKG/Qtc if he receives Haldol.    3/24/23  Avoid sedatives  Continue Haldol prn    3/25/23  Improving     3/28/23  Re-consulted Tele-psychiatry today  Started on Zyprexa 5 mg qHS    3/29/23  No reported agitation, hallucinations  Continue Zyprexa 5 mg PO qHS    3/30/23  Now in restraints  Psychiatry consult

## 2023-03-30 NOTE — ASSESSMENT & PLAN NOTE
Family seeking long term NH placement.    3/23/23  He has been accepted to [facility] once medically stable  He will need TB and COVID tests prior to discharge (TB order placed)    3/27/23  Awaiting placement    3/30/23  Pending improvement with agitation for placement

## 2023-03-30 NOTE — ASSESSMENT & PLAN NOTE
-ammonia level elevated 65.    -lactulose 10 g p.o. t.i.d..    -repeat ammonia level in a.m..  -continue rifaximin    3/22/23  Ammonia higher today  Add lactulose enemas. Can discontinue once patient has bowel movements  Hepatology input appreciated  Continue management as above    3/23/23  Ammonia level improved to 68 post lactulose enemas  Continue Rifaximin  Monitor    3/24/22  Unable to get labs today  Continue lactulose enemas and Rifaximin    3/25/23  Improving  Patient is oriented and cooperative today    3/28/23  Continue lactulose and rifaximin    3/29/23  Patient refused meds this AM, encourage to take  RN to re-attempt to give meds this afternoon  Overall, mental status improved since admission  Currently awake, alert, following commands, oriented to self, place    3/30/23  Placed in restraints and facemask applied overnight  Patient is awake, oriented to self; refusing medications  Patient not cooperative, reviewed events  Palliative Care consulted for [facility], appreciate assistance

## 2023-03-30 NOTE — CONSULTS
[facility]  Psychiatry  Telepsychiatry Consult Note        Please see previous notes: see prior psychiatric consultation notes during this hospitalization.      Patient agreeable to consultation via telepsychiatry.    Tele-Consultation from Psychiatry started: 3/30/2023 at 2:35 PM  The chief complaint leading to psychiatric consultation is: Continued agitation in delirium   This consultation was requested by Samy King MD, the Emergency Department attending physician.  The location of the consulting psychiatrist is [place], [place].    The patient location is  [facility] MEDICAL SURGICAL UNIT   Also present with the patient at the time of the consultation: nurse / tech     Patient Identification:   Regino Cowan is a 72 y.o. male.    Patient information was obtained from patient, past medical records, and primary team.    Inpatient consult to Telemedicine - Psych  Consult performed by: Miguel Ángel Esteves MD  Consult ordered by: Samy King MD      Consult Start Time: 03/30/2023 14:35 CDT  Consult End Time: 03/30/2023 15:47 CDT      HISTORY    Per Initial History from Primary Team:   Patient presents with    Altered Mental Status       Hx. Of hepatic encephalopathy, released from [facility] today for same.    Mr. Cowan is a 72-year-old  male with PMH significant for liver cirrhosis, was hospitalized at [facility] for hepatic encephalopathy, and discharged earlier this afternoon.  Patient was treated with lactulose, rifaximin.  Developed delirium during this hospitalization, required treatment with Haldol, Geodon per Psychiatry.  Family wanted to pursue [facility] nursing home placement, process was initiated.  However patient had hepatology appointment today 3/21/23 with Dr. Munoz, hence was discharged from [email or web address]  Dr. Munoz evaluated patient in the clinic, and was sent to [facility] ED for admission for AMS and generalized deconditioning.  Ammonia level  elevated 65.  Received lactulose in the ED. No family at the bedside.   Overview/Hospital Course from Primary Team:  Regino Johnson is a 72 year old male who was admitted to [facility] for hepatic encephalopathy. Patient presented to ED with AMS in setting of elevated ammonia. CT Head negative. Administration of PO lactulose difficult due to patient's agitation so hepatology recommended lactulose enemas.  03/23/23  Telepsych evaluated as patient had behavioral issues yesterday, recommended sitter or tele sitter at all times, he has no capacity to refuse necessary medical care at this time, Haldol PRN for agitation, and EKG if he receives Haldol. Agitation may have been from increased ammonia level as he had not had a BM. Overnight patient had several BMs post lactulose enemas. More alert and responsive today. Calm and cooperative. Ammonia level 68 from 119.  He has been accepted to [facility] for NH placement once medically stable.   3/24/23  Anticipate discharge to [facility] on Monday. Continue laxatives/Rifaximin for hepatic encephalopathy.   3/27/23  Reviewed overnight events. Haldol IM given. Patient awake, alert this morning, dressed in preperation to leave. NH requesting patient be out of restraints x 72 hours per  and nursing staff. Plan to d/c in AM if remains stable. Updated patient's daughter over phone.  3/28/23  Patient received dose of IM haldol again overnight due to reported agitation issues. Re-consulted tele-psychiatry, recommendations appreciated, started on Zyprexa 5 mg PO qHS. Patient resting in bed this afternoon, no new issues otherwise. Remains medically stable for transfer to SNF/NH.  3/29/23  NAEON. Started on zyprexa PO last night. Did not require IM haldol. Patient calm, awake, alert to self, place, situation. Patient refused morning meds, including lactulose. Encouraged patient to his medications as requested. Son-in-law  at bedside, updated.  3/30/23  Overnight events reviewed, now in restraints and facemask applied as he was noted to be spitting on staff. Patient is awake, oriented to self only, confused. Palliative Care to see today, appreciate assistance.      Chief Complaint / Reason for Psychiatry Consult: Continued agitation in delirium       Subjective / Interval Psychiatric History Today (03/30/2023) (with Psychiatric ROS below):  Regino Cowan is a 72 y.o. male with a past medical history of significant for liver cirrhosis and hepatic encephalopathy, and a past psychiatric history of alcohol use disorder and delirium, currently being treated by his inpatient primary team for a principle problem of acute hepatic encephalopathy.  Psychiatry was originally consulted as noted above.  The patient was seen and examined.  The chart was reviewed.  On examination today, the patient was in soft restraints, appeared confused / irritable, and was only oriented to person and state.  He was disoriented to name of place, type of place, city, month, year, and situation.  He denied any current physical complaints at this time, but the validity of these statements are questionable given his AMS / Delirium.  NAD was observed during the examination.  Despite multiple attempts, the comprehensive psychiatric assessment was notably limited due to the patient's AMS / Delirium.  Psychotherapy was implemented as noted below with a focus on improving confusion, orientation, and behavioral disturbances in the context of his AMS / Delirium.  See detailed psych ROS below.  See A/P below.        Psychiatric Review Of Systems - Currently, the patient is endorsing and/or denying the following:  Attempted but unable to assess due to patient's AMS / Delirium       PSYCHOTHERAPY ADD-ON +33410   30 (16-37*) minutes    Time: 18 minutes  Participants: Met with patient    Therapeutic Intervention Type: behavior modifying psychotherapy, supportive  psychotherapy  Why chosen therapy is appropriate versus another modality: relevant to diagnosis, patient responds to this modality, evidence based practice    Target symptoms: confusion, disorientation, and behavioral disturbances in the context of his AMS / Delirium  Primary focus: improving confusion, orientation, and behavioral disturbances in the context of his AMS / Delirium  Psychotherapeutic techniques: supportive and psychodynamic techniques; psycho-education; re-orientation; behavioral modification; reality / insight orientation; problem solving techniques and managing life / medical stressors    Outcome monitoring methods: self-report, observation    Patient's response to intervention:  The patient's response to intervention is limited.     Progress toward goals:  The patient's progress toward goals is limited.        ROS (limited validity due to the patient's AMS / Delirium):  General ROS: negative for - chills, fatigue, fever or night sweats  Ophthalmic ROS: negative for - blurry vision, double vision or eye pain  ENT ROS: negative for - sinus pain, headaches, sore throat or visual changes  Allergy and Immunology ROS: negative for - hives, itchy/watery eyes or nasal congestion  Hematological and Lymphatic ROS: negative for - bleeding problems, bruising, jaundice or pallor  Endocrine ROS: negative for - galactorrhea, hot flashes, mood swings, palpitations or temperature intolerance  Respiratory ROS: negative for - cough, hemoptysis, shortness of breath, tachypnea or wheezing  Cardiovascular ROS: negative for - chest pain, dyspnea on exertion, loss of consciousness, palpitations, rapid heart rate or shortness of breath  Gastrointestinal ROS: negative for - appetite loss, nausea, abdominal pain, blood in stools, change in bowel habits, constipation or diarrhea  Genito-Urinary ROS: negative for - incontinence, nocturia or pelvic pain  Musculoskeletal ROS: negative for - joint stiffness, joint swelling, joint  pain or muscle pain   Neurological ROS: negative for - dizziness, numbness/tingling or seizures; positive for behavioral changes, confusion, & memory loss  Dermatological ROS: negative for dry skin, hair changes, pruritus or rash  Psychiatric ROS: see detailed psychiatric ROS above in subjective section       PAST MEDICAL & SURGICAL HISTORY   Past Medical History:   Diagnosis Date    Hypertension     Liver cirrhosis      History reviewed. No pertinent surgical history.    NEUROLOGIC HISTORY  Seizures: none per chart review (unknown if prior complicated withdrawal hx)  Head trauma with LOC: none per chart review   CVA: none per chart review      FAMILY HISTORY   Family History   Problem Relation Age of Onset    Heart disease Mother        ALLERGIES   Review of patient's allergies indicates:  No Known Allergies    CURRENT MEDICATION REGIMEN   Home Meds:   Prior to Admission medications    Medication Sig Start Date End Date Taking? Authorizing Provider   acetaminophen (TYLENOL) 325 MG tablet Take 2 tablets (650 mg total) by mouth every 8 (eight) hours as needed for Temperature greater than (or equal to 101 degree F). 3/7/23   Zack Rodriguez MD   finasteride (PROSCAR) 5 mg tablet Take 1 tablet (5 mg total) by mouth once daily. 3/8/23 3/7/24  Zack Rodriguez MD   folic acid (FOLVITE) 1 MG tablet Take 1 tablet (1 mg total) by mouth once daily. 3/21/23 3/20/24  KENNETH Laird MD   furosemide (LASIX) 40 MG tablet Take 40 mg by mouth once daily.     Historical Provider   lactulose (CHRONULAC) 20 gram/30 mL Soln Take 30 mLs (20 g total) by mouth 3 (three) times daily. 3/7/23   Zack Rodriguez MD   lactulose (CHRONULAC) 20 gram/30 mL Soln Take 30 mLs (20 g total) by mouth daily as needed. Extra dosing for increased confusion or constipation/reduced bowel movements less then 3x daily 3/7/23   Zack Rodriguez MD   multivitamin Tab Take 1 tablet by mouth once daily. 3/8/23   Zack Rodriguez MD    OLANZapine (ZYPREXA) 5 MG tablet Take 1 tablet (5 mg total) by mouth every evening. 3/29/23 3/28/24  Samy King MD   pantoprazole (PROTONIX) 40 MG tablet Take 1 tablet (40 mg total) by mouth once daily. 3/8/23 3/7/24  Zack Rodriguez MD   potassium chloride (KLOR-CON) 10 MEQ TbSR Take 10 mEq by mouth once daily.    Historical Provider   propranoloL (INDERAL) 10 MG tablet Take 10 mg by mouth 2 (two) times daily.    Historical Provider   rifAXIMin (XIFAXAN) 550 mg Tab Take 550 mg by mouth 2 (two) times daily.    Historical Provider   spironolactone (ALDACTONE) 25 MG tablet Take 1 tablet (25 mg total) by mouth once daily. 3/8/23 3/7/24  Zack Rodriguez MD   thiamine 100 MG tablet Take 1 tablet (100 mg total) by mouth once daily. 3/21/23   RISHI. Mamta Laird MD   albuterol 90 mcg/actuation inhaler Inhale 2 puffs into the lungs every 6 (six) hours as needed for Wheezing. Rescue  Patient not taking: Reported on 5/11/2022 2/11/18 5/11/22  Evangelista Zuleta MD       Scheduled Meds:    finasteride  5 mg Oral Daily    folic acid  1 mg Oral Daily    lactulose  20 g Oral TID    OLANZapine  5 mg Oral QHS    pantoprazole  40 mg Oral Daily    polyethylene glycol  17 g Oral Daily    propranoloL  10 mg Oral BID    rifAXIMin  550 mg Oral BID    thiamine  100 mg Oral Daily      PRN Meds: aluminum-magnesium hydroxide-simethicone, guaiFENesin 100 mg/5 ml, haloperidol lactate   Psychotherapeutics (From admission, onward)      Start     Stop Route Frequency Ordered    03/30/23 1008  haloperidol lactate injection 5 mg         -- IM Every 6 hours PRN 03/30/23 0908    03/28/23 2100  OLANZapine tablet 5 mg         -- Oral Nightly 03/28/23 1613            LABORATORY DATA   Recent Results (from the past 72 hour(s))   Magnesium    Collection Time: 03/28/23  8:04 AM   Result Value Ref Range    Magnesium 1.7 1.6 - 2.6 mg/dL   Comprehensive Metabolic Panel    Collection Time: 03/28/23  8:04 AM   Result Value Ref Range    Sodium 138  136 - 145 mmol/L    Potassium 4.1 3.5 - 5.1 mmol/L    Chloride 108 95 - 110 mmol/L    CO2 23 23 - 29 mmol/L    Glucose 96 70 - 110 mg/dL    BUN 16 8 - 23 mg/dL    Creatinine 1.0 0.5 - 1.4 mg/dL    Calcium 8.9 8.7 - 10.5 mg/dL    Total Protein 5.5 (L) 6.0 - 8.4 g/dL    Albumin 2.5 (L) 3.5 - 5.2 g/dL    Total Bilirubin 1.1 (H) 0.1 - 1.0 mg/dL    Alkaline Phosphatase 121 55 - 135 U/L    AST 43 (H) 10 - 40 U/L    ALT 22 10 - 44 U/L    Anion Gap 7 (L) 8 - 16 mmol/L    eGFR >60 >60 mL/min/1.73 m^2   Magnesium    Collection Time: 03/29/23  6:21 AM   Result Value Ref Range    Magnesium 1.6 1.6 - 2.6 mg/dL   Comprehensive Metabolic Panel    Collection Time: 03/29/23  6:21 AM   Result Value Ref Range    Sodium 141 136 - 145 mmol/L    Potassium 3.6 3.5 - 5.1 mmol/L    Chloride 113 (H) 95 - 110 mmol/L    CO2 22 (L) 23 - 29 mmol/L    Glucose 82 70 - 110 mg/dL    BUN 13 8 - 23 mg/dL    Creatinine 1.0 0.5 - 1.4 mg/dL    Calcium 8.6 (L) 8.7 - 10.5 mg/dL    Total Protein 4.8 (L) 6.0 - 8.4 g/dL    Albumin 2.3 (L) 3.5 - 5.2 g/dL    Total Bilirubin 1.1 (H) 0.1 - 1.0 mg/dL    Alkaline Phosphatase 86 55 - 135 U/L    AST 39 10 - 40 U/L    ALT 19 10 - 44 U/L    Anion Gap 6 (L) 8 - 16 mmol/L    eGFR >60 >60 mL/min/1.73 m^2   Magnesium    Collection Time: 03/30/23  6:59 AM   Result Value Ref Range    Magnesium 1.6 1.6 - 2.6 mg/dL   Comprehensive Metabolic Panel    Collection Time: 03/30/23  6:59 AM   Result Value Ref Range    Sodium 139 136 - 145 mmol/L    Potassium 3.9 3.5 - 5.1 mmol/L    Chloride 109 95 - 110 mmol/L    CO2 23 23 - 29 mmol/L    Glucose 102 70 - 110 mg/dL    BUN 15 8 - 23 mg/dL    Creatinine 1.2 0.5 - 1.4 mg/dL    Calcium 8.9 8.7 - 10.5 mg/dL    Total Protein 5.4 (L) 6.0 - 8.4 g/dL    Albumin 2.5 (L) 3.5 - 5.2 g/dL    Total Bilirubin 1.0 0.1 - 1.0 mg/dL    Alkaline Phosphatase 106 55 - 135 U/L    AST 41 (H) 10 - 40 U/L    ALT 22 10 - 44 U/L    Anion Gap 7 (L) 8 - 16 mmol/L    eGFR >60 >60 mL/min/1.73 m^2   Ammonia     "Collection Time: 03/30/23 12:54 PM   Result Value Ref Range    Ammonia 77 (H) 10 - 50 umol/L      No results found for: PHENYTOIN, PHENOBARB, VALPROATE, CBMZ      EXAMINATION    VITALS   Vitals:    03/30/23 1103 03/30/23 1108 03/30/23 1417 03/30/23 1442   BP: (!) 147/70      BP Location: Left arm      Patient Position: Lying      Pulse: 66      Resp: 15      Temp:  97.1 °F (36.2 °C)     TempSrc:       SpO2: 99%      Weight:   64.6 kg (142 lb 6.7 oz)    Height:   5' 4" (1.626 m) 5' 4" (1.626 m)      CONSTITUTIONAL  General Appearance: NAD, unremarkable, age appropriate, normal weight, disheveled, lying in bed, restless    MUSCULOSKELETAL  Muscle Strength and Tone: WNL    Abnormal Involuntary Movements: none observed   Gait and Station: Attempted but unable to assess due to patient's AMS / Delirium     PSYCHIATRIC   Behavior/Cooperation:  reluctant to participate, uncooperative, restless and fidgety , eye contact intermittent   Speech:  irritable tone, normal pitch, normal volume, slowed, increased latency of response  Language: grossly intact with spontaneous speech  Mood:  Attempted but unable to assess due to patient's AMS / Delirium   Affect:  constricted / irritable   Associations: +MILLER  Thought Process: confused / disorganized   Thought Content: Attempted but unable to assess due to patient's AMS / Delirium   Sensorium: Delirium  Alert and Oriented: to person and state only ; disoriented to name of place, type of place, city, month, year, and situation   Memory: Attempted but unable to assess due to patient's AMS / Delirium   Attention/concentration: Impaired / Limited   Similarities: Impaired / Limited   Abstract reasoning: Impaired / Limited   Fund of Knowledge: Attempted but unable to assess due to patient's AMS / Delirium   Insight: Impaired / Limited   Judgment: Impaired / Limited    CAM ICU Delirium Assessment - POSITIVE     Is the patient aware of the biomedical complications associated with substance " abuse and mental illness?   Attempted but unable to assess due to patient's AMS / Delirium       MEDICAL DECISION MAKING    ASSESSMENT        Delirium due to Medical Condition with Behavioral Disturbance   Hepatic Encephalopathy   Alcohol Use Disorder, Severe, Dependence, in sustained remission   (Rule out underlying neurocognitive disorder secondary to years of alcohol use in addition to recurrent episodes of hepatic encephalopathy)       RECOMMENDATIONS       - Patient does not currently meet PEC criteria due to not being an imminent threat to self/others and not being gravely disabled 2/2 mental illness at this time.    - With reasonable medical certainty, based on a present state examination, the patient currently DOES NOT appear to have medical decision-making capacity as made evident by his inability to cognitively utilize information provided to him to express a choice that is stable over time, to understand the relevant information pertaining his diagnoses and recommended treatments, to appreciate the consequences of the decision (risks vs benefits) regarding accepting vs refusing treatment, or to manipulate all of the data in a logical fashion.  Please seek appropriate surrogate decision maker to give substituted judgment.       - Increase Zyprexa to 2.5 mg PO QAM and 7.5 mg PO QHS for behavioral disturbances in delirium (attempted to discuss risks/benefits/alt vs no treatment with patient).    Implement the below DELIRIUM BEHAVIOR MANAGEMENT:  - Minimize use of restraints; if physical restraints necessary, please utilize medical/chemical prns for agitation (can use Haldol 2 mg IM q6 hours PRN).  - Keep shades open and room lit during day and room dim at night in order to promote healthy circadian rhythms.  - Encourage family at bedside.  - Keep whiteboard in patient's room current with the date and name of the members of patient's team for easy patient self re-orientation.  - Avoid benzodiazepines,  "antihistamines, anticholinergics, hypnotics, and minimize opiates while controlling for pain as these medications may exacerbate delirium.     - Continue folate / thiamine / multi-vitamin supplementation given hx of alcohol use disorder ; consider high dose IV Thiamine supplementation (attempted to discuss risks/benefits/alt vs no treatment with patient).     - Psychotherapy was performed with patient as noted above with a focus on improving confusion, orientation, and behavioral disturbances in the context of his AMS / Delirium.    - Patient's most recent resulted labs, imaging, and EKG were reviewed today ; EKG on 03/29/2023 with NSR and QTc of 448 ; Ammonia today remains elevated at 77 ; CT Head on 03/21/2023 with "No acute intracranial hemorrhage or mass effect.  Chronic findings similar to prior.  Ventricle stable caliber.  No displaced calvarial fracture or adverse osseous finding."  Continue to monitor EKG to ensure that QTc remains below 500 while patient is needing neuroleptics.      - Agree with Palliative Care Team involvement.       - Attempted to instruct the patient to call 911 and/or 988, and return to the nearest ED if he begins feeling suicidal, homicidal, or gravely disabled (for s/p this hospitalization).       - Thank you for this consult         Total time spent with patient face-to-face and/or managing/coordinating patient's care today (excluding the time spent on psychotherapy): 54 minutes   Time spent on psychotherapy today (as noted above): 18 minutes   Total time for encounter today including psychotherapy: 72 minutes      More than 50% of the time was spent counseling/coordinating care.     Consulting clinician was informed of the encounter and consult note.     Consultation ended: 3/30/2023 at 3:47 PM       STAFF:  Miguel Ángel Esteves MD  [facility]   3/30/2023  "

## 2023-03-31 VITALS
WEIGHT: 126.56 LBS | HEART RATE: 60 BPM | DIASTOLIC BLOOD PRESSURE: 88 MMHG | RESPIRATION RATE: 18 BRPM | HEIGHT: 64 IN | BODY MASS INDEX: 21.61 KG/M2 | TEMPERATURE: 98 F | SYSTOLIC BLOOD PRESSURE: 140 MMHG | OXYGEN SATURATION: 92 %

## 2023-03-31 LAB
ALBUMIN SERPL BCP-MCNC: 2.7 G/DL (ref 3.5–5.2)
ALP SERPL-CCNC: 80 U/L (ref 55–135)
ALT SERPL W/O P-5'-P-CCNC: 24 U/L (ref 10–44)
AMMONIA PLAS-SCNC: 86 UMOL/L (ref 10–50)
ANION GAP SERPL CALC-SCNC: 10 MMOL/L (ref 8–16)
AST SERPL-CCNC: 53 U/L (ref 10–40)
BASOPHILS # BLD AUTO: 0.06 K/UL (ref 0–0.2)
BASOPHILS NFR BLD: 1 % (ref 0–1.9)
BILIRUB SERPL-MCNC: 1.6 MG/DL (ref 0.1–1)
BUN SERPL-MCNC: 13 MG/DL (ref 8–23)
CALCIUM SERPL-MCNC: 9 MG/DL (ref 8.7–10.5)
CHLORIDE SERPL-SCNC: 111 MMOL/L (ref 95–110)
CO2 SERPL-SCNC: 21 MMOL/L (ref 23–29)
CREAT SERPL-MCNC: 0.8 MG/DL (ref 0.5–1.4)
DIFFERENTIAL METHOD: ABNORMAL
EOSINOPHIL # BLD AUTO: 0.4 K/UL (ref 0–0.5)
EOSINOPHIL NFR BLD: 6.1 % (ref 0–8)
ERYTHROCYTE [DISTWIDTH] IN BLOOD BY AUTOMATED COUNT: 14.8 % (ref 11.5–14.5)
EST. GFR  (NO RACE VARIABLE): >60 ML/MIN/1.73 M^2
GLUCOSE SERPL-MCNC: 82 MG/DL (ref 70–110)
HCT VFR BLD AUTO: 32.1 % (ref 40–54)
HGB BLD-MCNC: 11.5 G/DL (ref 14–18)
IMM GRANULOCYTES # BLD AUTO: 0.02 K/UL (ref 0–0.04)
IMM GRANULOCYTES NFR BLD AUTO: 0.3 % (ref 0–0.5)
LYMPHOCYTES # BLD AUTO: 1.5 K/UL (ref 1–4.8)
LYMPHOCYTES NFR BLD: 24.2 % (ref 18–48)
MCH RBC QN AUTO: 32.1 PG (ref 27–31)
MCHC RBC AUTO-ENTMCNC: 35.8 G/DL (ref 32–36)
MCV RBC AUTO: 90 FL (ref 82–98)
MONOCYTES # BLD AUTO: 0.7 K/UL (ref 0.3–1)
MONOCYTES NFR BLD: 10.7 % (ref 4–15)
NEUTROPHILS # BLD AUTO: 3.6 K/UL (ref 1.8–7.7)
NEUTROPHILS NFR BLD: 57.7 % (ref 38–73)
NRBC BLD-RTO: 0 /100 WBC
PLATELET # BLD AUTO: 101 K/UL (ref 150–450)
PMV BLD AUTO: 10 FL (ref 9.2–12.9)
POTASSIUM SERPL-SCNC: 3.8 MMOL/L (ref 3.5–5.1)
PROT SERPL-MCNC: 5.6 G/DL (ref 6–8.4)
RBC # BLD AUTO: 3.58 M/UL (ref 4.6–6.2)
SODIUM SERPL-SCNC: 142 MMOL/L (ref 136–145)
WBC # BLD AUTO: 6.24 K/UL (ref 3.9–12.7)

## 2023-03-31 PROCEDURE — 63600175 PHARM REV CODE 636 W HCPCS: Performed by: HOSPITALIST

## 2023-03-31 PROCEDURE — 25000003 PHARM REV CODE 250: Performed by: HOSPITALIST

## 2023-03-31 PROCEDURE — 25000003 PHARM REV CODE 250: Performed by: INTERNAL MEDICINE

## 2023-03-31 PROCEDURE — 99233 PR SUBSEQUENT HOSPITAL CARE,LEVL III: ICD-10-PCS | Mod: ,,, | Performed by: NURSE PRACTITIONER

## 2023-03-31 PROCEDURE — 80053 COMPREHEN METABOLIC PANEL: CPT | Performed by: HOSPITALIST

## 2023-03-31 PROCEDURE — 85025 COMPLETE CBC W/AUTO DIFF WBC: CPT | Performed by: HOSPITALIST

## 2023-03-31 PROCEDURE — 36415 COLL VENOUS BLD VENIPUNCTURE: CPT | Performed by: HOSPITALIST

## 2023-03-31 PROCEDURE — 82140 ASSAY OF AMMONIA: CPT | Performed by: HOSPITALIST

## 2023-03-31 PROCEDURE — 99497 ADVNCD CARE PLAN 30 MIN: CPT | Mod: ,,, | Performed by: NURSE PRACTITIONER

## 2023-03-31 PROCEDURE — 99497 PR ADVNCD CARE PLAN 30 MIN: ICD-10-PCS | Mod: ,,, | Performed by: NURSE PRACTITIONER

## 2023-03-31 PROCEDURE — 99233 SBSQ HOSP IP/OBS HIGH 50: CPT | Mod: ,,, | Performed by: NURSE PRACTITIONER

## 2023-03-31 RX ORDER — OLANZAPINE 2.5 MG/1
2.5 TABLET ORAL DAILY
Qty: 30 TABLET | Refills: 11 | Status: SHIPPED | OUTPATIENT
Start: 2023-04-01 | End: 2024-03-31

## 2023-03-31 RX ORDER — OLANZAPINE 7.5 MG/1
7.5 TABLET ORAL NIGHTLY
Qty: 30 TABLET | Refills: 11 | Status: SHIPPED | OUTPATIENT
Start: 2023-03-31 | End: 2024-03-30

## 2023-03-31 RX ADMIN — HALOPERIDOL LACTATE 2 MG: 5 INJECTION, SOLUTION INTRAMUSCULAR at 03:03

## 2023-03-31 RX ADMIN — LACTULOSE 20 G: 20 SOLUTION ORAL at 10:03

## 2023-03-31 RX ADMIN — OLANZAPINE 7.5 MG: 5 TABLET, FILM COATED ORAL at 10:03

## 2023-03-31 RX ADMIN — HALOPERIDOL LACTATE 2 MG: 5 INJECTION, SOLUTION INTRAMUSCULAR at 10:03

## 2023-03-31 RX ADMIN — OLANZAPINE 2.5 MG: 2.5 TABLET, FILM COATED ORAL at 10:03

## 2023-03-31 RX ADMIN — PROPRANOLOL HYDROCHLORIDE 10 MG: 10 TABLET ORAL at 10:03

## 2023-03-31 RX ADMIN — RIFAXIMIN 550 MG: 550 TABLET ORAL at 10:03

## 2023-03-31 NOTE — CARE UPDATE
Advance Care Planning   [facility] - Med Surg  Palliative Care RN      Patient Name: Regino Cowan  MRN: [id number]  Admission Date: 3/21/2023  Hospital Length of Stay: 9 days  Code Status: DNR   Attending Provider: Samy King MD  Palliative Care Provider: Lola Palacios NP  Primary Care Physician: Rod Norman MD  Principal Problem:Acute hepatic encephalopathy    Spoke with Lindsey with [facility] who said they still hadn't received referral. Provided patient demographics and she was able to review via Epic access. Will call derik Aceves now for consents. Provided Lindsey with number to med-surg floor. Updated that I will be leaving, as well as CM and asked that she update charge nurse and bedside RN once consents are signed and D/C orders are needed as well as transport. Fax [phone] for D/C orders.     1716 confirmed will Lindsey consents signed and patient can be discharged to [facility]. Number for report [phone] ask for charge nurse. Will need ambulance transport set up to: [facility] [id number] [place] [address] once report is called. Do not need to send DC orders, because [facility] can access. Asked bedside nurse and charge nurse to ensure patients phone, hearing aids and any other personal items are sent with him and call derik Webster [phone] once he is picked up. Updated CM, attending, bedside RN, and charge nurse.     Sandy Shelton RN-PN, Palliative Medicine   [phone]

## 2023-03-31 NOTE — PLAN OF CARE
03/31/23 1737   Post-Acute Status   Post-Acute Authorization Hospice   Hospice Status Set-up Complete/Auth obtained   Discharge Delays None known at this time   Discharge Plan   Discharge Plan A Inpatient Hospice     Plan discharge to [facility] with [facility]

## 2023-03-31 NOTE — PROGRESS NOTES
Advance Care Planning    Progress Note  Palliative Medicine      Consult Requested By: Samy King MD  Reason for Consult: Goals of care and Advance care planning    SUBJECTIVE:     History of Present Illness:  Regino Cowan is a 72 y.o. year old  male with a history of alcohol-related cirrhosis, hypertension, and cognitive decline (NYD). He is S/P [facility] in [place], and followed by Dr. Munoz. He presented to ED on 3/21/2023 with altered mental status following his appointment with Dr. Munoz. CT head negative, and ammonia elevated. He was admitted for acute hepatic encephalopathy.     Course In Hospital: He is being treated with laxatives and rifaximin for hepatic encephalopathy. He is confused, intermittently agitated, refusing medications, and combative towards staff. Psych is following, and have recommended Haldol PRN and Zyprexa.     Palliative care encounter: Follow up goals of care, ACP, EOL discussion including hospice. Today patient lethargic during exam, did not arouse to verbal stimuli in which he only received prn haldol on yesterday and zyprexa 2.5mg today in which it appears patient's mental status change in the setting of disease progression. Family meeting today via phone conference with patient's two adult children, Eleanor and Aleks along with patient's brother Celestino and his wife. We had extensive discussion about patient's hospital coarse and progressive decline prior to arrival.Continued discussion about hospice in this setting in which family elected to enroll patient in hospice, [facility] chosen due to close proximity to daughter's home in [place]. At this time they want to focus on comfort and quality of life. Patient's son and brother live in [place]. Plan is to likely discharge to inpatient hospice today, but consents will have to be signed by daughter who lives in [place] this will be arranged by hospice agency. We then discussed code status: full code vs  "DNR/I along with risks, benefits, and alternatives. Eleanor stated that patient "wanted to be DNR" at [facility]. However, LaPOST was not completed. All family in agreement with honoring patient's wishes and elected to change code status to DNR/I, reflected in chart and LaPOST to be completed by hospice agency.     Past Medical History:   Diagnosis Date    Hypertension     Liver cirrhosis      History reviewed. No pertinent surgical history.  Family History   Problem Relation Age of Onset    Heart disease Mother        Social History     Socioeconomic History    Marital status:    Tobacco Use    Smoking status: Former     Packs/day: 2.00     Years: 28.00     Pack years: 56.00     Types: Cigarettes    Smokeless tobacco: Never    Tobacco comments:     quit about 30 years ago    Substance and Sexual Activity    Alcohol use: No     Comment: quit about 10 years ago    Drug use: No     Social Determinants of Health     Financial Resource Strain: Low Risk     Difficulty of Paying Living Expenses: Not hard at all   Food Insecurity: No Food Insecurity    Worried About Running Out of Food in the Last Year: Never true    Ran Out of Food in the Last Year: Never true   Transportation Needs: No Transportation Needs    Lack of Transportation (Medical): No    Lack of Transportation (Non-Medical): No   Physical Activity: Inactive    Days of Exercise per Week: 0 days    Minutes of Exercise per Session: 0 min   Stress: No Stress Concern Present    Feeling of Stress : Not at all   Social Connections: Moderately Isolated    Frequency of Communication with Friends and Family: More than three times a week    Frequency of Social Gatherings with Friends and Family: More than three times a week    Attends [employer] Services: More than 4 times per year    Active Member of Clubs or Organizations: No    Attends Club or Organization Meetings: Never    Marital Status:    Housing Stability: Low Risk     Unable to Pay for " Housing in the Last Year: No    Number of Places Lived in the Last Year: 1    Unstable Housing in the Last Year: No      Review of patient's allergies indicates:  No Known Allergies    Medications:    Current Facility-Administered Medications:     aluminum-magnesium hydroxide-simethicone 200-200-20 mg/5 mL suspension 30 mL, 30 mL, Oral, Q6H PRN, Loy Olivo MD    finasteride tablet 5 mg, 5 mg, Oral, Daily, Loy Olivo MD, 5 mg at 03/29/23 1248    folic acid tablet 1 mg, 1 mg, Oral, Daily, Loy Olivo MD, 1 mg at 03/29/23 1248    guaiFENesin 100 mg/5 ml syrup 200 mg, 200 mg, Oral, Q4H PRN, Loy Olivo MD    haloperidol lactate injection 2 mg, 2 mg, Intramuscular, Q6H PRN, Samy King MD    lactulose 20 gram/30 mL solution Soln 20 g, 20 g, Oral, TID, Samy King MD, 20 g at 03/31/23 1029    OLANZapine tablet 2.5 mg, 2.5 mg, Oral, Daily, Samy King MD, 2.5 mg at 03/31/23 1030    OLANZapine tablet 7.5 mg, 7.5 mg, Oral, QHS, Samy King MD, 7.5 mg at 03/30/23 2205    pantoprazole EC tablet 40 mg, 40 mg, Oral, Daily, Loy Olivo MD, 40 mg at 03/29/23 1247    polyethylene glycol packet 17 g, 17 g, Oral, Daily, Samy King MD    propranoloL tablet 10 mg, 10 mg, Oral, BID, Loy Olivo MD, 10 mg at 03/31/23 1018    rifAXIMin tablet 550 mg, 550 mg, Oral, BID, Loy Olivo MD, 550 mg at 03/31/23 1017    thiamine tablet 100 mg, 100 mg, Oral, Daily, Loy Olivo MD, 100 mg at 03/26/23 1015    ROS:  Review of Systems   Unable to perform ROS: Mental status change (Pt is currently confused and refusing to answer questions)     OBJECTIVE:     Physical Exam:  Vitals: Temp: 97.3 °F (36.3 °C) (03/31/23 1146)  Pulse: 60 (03/31/23 1146)  Resp: 18 (03/31/23 1146)  BP: 119/84 (03/31/23 1146)  SpO2: 98 % (03/31/23 1146)    Physical Exam  Vitals (Limited as pt refused) and nursing note reviewed.   Constitutional:       Appearance: He is ill-appearing and toxic-appearing.      Comments: Limited PE as patient was asleep  and did not want to restart his agitation.     HENT:      Head: Normocephalic.   Eyes:      General: No scleral icterus.     Conjunctiva/sclera: Conjunctivae normal.      Pupils: Pupils are equal, round, and reactive to light.   Cardiovascular:      Pulses: Normal pulses.      Comments: Pt declined cardiac exam. He stated he did not want to be touched with stethoscope.  Pulmonary:      Effort: Pulmonary effort is normal. No respiratory distress.   Abdominal:      General: There is no distension.      Comments: Not completed as patient stated he did not want to be touched with stethoscope.   Musculoskeletal:      Cervical back: Normal range of motion.      Comments: Bilateral wrist restraints applied.   Skin:     General: Skin is dry.      Coloration: Skin is not jaundiced.      Findings: Bruising (left hand and left forearm) present.   Neurological:      Mental Status: He is lethargic.   Psychiatric:         Cognition and Memory: Cognition is impaired.      Comments: Pt is confused and refusing to answer questions.          Review of Symptoms      Symptom Assessment (ESAS 0-10 Scale)  Pain:  0  Dyspnea:  0  Anxiety:  0  Nausea:  0  Depression:  0  Anorexia:  0  Fatigue:  0  Insomnia:  0  Restlessness:  0  Agitation:  0 due to Mental status change     CAM / Delirium:  Positive  Constipation: unable to assess due to pt's confusion.  Diarrhea: unable to assess due to pt's confusion.      Pain Assessment in Advanced Demential Scale (PAINAD)   Breathing - Independent of vocalization:  0  Negative vocalization:  0  Facial expression:  0  Body language:  1  Consolability:  1  Total:  2    Performance Status:  30    ECOG Performance Status rdGrdrrdarddrderd:rd rd3rd Living Arrangements:  Lives alone    Psychosocial/Cultural:   See Palliative Psychosocial Note: Yes  Patient lived alone prior to this admission. There is no signed advance directive document. Daughter, Eleanor Hamlin ([id number]) is the primary . Patient also  has a son, Aleks Cowan ([id number]) who lives in [place].  **Primary  to Follow**  Palliative Care  Consult: No    Spiritual:  F - Analia and Belief:  Patient stated that he is Latter-day.     Time-Based Charting:  Yes  Chart Review: 40 minutes  Face to Face: 20 minutes  Advance Care Plannin minutes    Total Time Spent: 80 minutes    Advance Directives:   Living Will: No        Oral Declaration: No    LaPOST: No    Do Not Resuscitate Status: No    Medical Power of : No        Oral Declaration: No      Decision Making:  Patient unable to communicate due to disease severity/cognitive impairment and Family answered questions (Per phone conversation with patient's daughterEleanor on 2023)  Goals of Care: The family endorses that what is most important right now is to focus on avoiding the hospital, symptom/pain control, quality of life, even if it means sacrificing a little time, and comfort and QOL     Accordingly, we have decided that the best plan to meet the patient's goals includes enrolling in hospice care      Labs:  WBC   Date Value Ref Range Status   2023 6.24 3.90 - 12.70 K/uL Final       Hemoglobin   Date Value Ref Range Status   2023 11.5 (L) 14.0 - 18.0 g/dL Final       Hematocrit   Date Value Ref Range Status   2023 32.1 (L) 40.0 - 54.0 % Final       MCV   Date Value Ref Range Status   2023 90 82 - 98 fL Final       Platelets   Date Value Ref Range Status   2023 101 (L) 150 - 450 K/uL Final       BMP  Lab Results   Component Value Date     2023    K 3.8 2023     (H) 2023    CO2 21 (L) 2023    BUN 13 2023    CREATININE 0.8 2023    CALCIUM 9.0 2023    ANIONGAP 10 2023    EGFRNORACEVR >60 2023       Lab Results   Component Value Date    AST 53 (H) 2023    ALKPHOS 80 2023    BILITOT 1.6 (H) 2023       Albumin   Date Value Ref Range Status    03/31/2023 2.7 (L) 3.5 - 5.2 g/dL Final       Radiology:I have reviewed all pertinent imaging results/findings within the past 24 hours.      ASSESSMENT   Alcohol-related Hepatic encephalopathy/Delirium/Cognitive Decline  Advanced Care Planning/Goals of Care Discussion    PLAN   Alcohol-related Hepatic encephalopathy/Delirium/Cognitive Decline  Patient is being treated with Rifaximin and lactulose. However, he remains confused, restraints and facemask applied as he was noted to be spitting on staff 3/30. Admitting team and Psych managing.  Discussed with Dr. King and pt's SDMs, Eleanor & Aleks, that pt's delirium and cognitive decline is likely multi-factorial in the setting of history of alcohol misuse, hepatic encephalopathy, and possible dementia.   Explained to pt's daughter that though pt is refusing medications, inserting an NG tube for feeding and medication administration is not advisable, as pt is at risk for aspiration and injury if he forcefully removes it.      Advanced Care Planning/Goals of Care Discussion  Code status: DNR/I reflected in chart, will defer LaPOST to hospice agency  GOC: Focus on comfort and quality of life and elected to enroll in hospice services. Likely to discharge to inpatient hospice today or tomorrow with [facility]. Daughter lives in [place], hospice agency will get consents signed. Patient transitioned to comfort focused treatment.     Discussed case and visit details with Dr. King.     Thank you for allowing Palliative Medicine to be involved in the care of Regino Cowan.       Medical decision making: poor prognosis management of more than one chronic illness in exacerbation or progression of disease    Plan required increased review of medication choice, interaction, dosing, frequency, and route due to patient complexity. Patient complexity increased by: altered mentation and at risk for delirium    60 min total   40 min spent discussing: code status,  assessed patient specific goals and addressed the best way to achieve them, coordination of care and emotional support, formulating and communicating prognosis, exploring burden/ benefit of various approaches of treatment, inquired about existing or willingness to complete advance directive documents.    20 min spent discussing ACP    Lola Palacios NP  Palliative Medicine

## 2023-03-31 NOTE — PLAN OF CARE
[facility] - Med Surg  Discharge Final Note    Primary Care Provider: Rod Norman MD    Expected Discharge Date: 3/31/2023    Final Discharge Note (most recent)       Final Note - 03/31/23 1757          Final Note    Assessment Type Final Discharge Note     Anticipated Discharge Disposition Hospice/Medical Facility        Post-Acute Status    Post-Acute Authorization Hospice     Hospice Status Set-up Complete/Auth obtained                     Important Message from Medicare  Important Message from Medicare regarding Discharge Appeal Rights: Given to patient/caregiver, Explained to patient/caregiver, Signed/date by patient/caregiver     Date IMM was signed: 03/27/23  Time IMM was signed: 1147     Follow-up providers       Hannah Munoz MD   Specialty: Gastroenterology, Hepatology    [address]  [place] [address]   Phone: [phone]       Next Steps: Schedule an appointment as soon as possible for a visit in 2 week(s)    [facility]   Specialty: Hospice Services    [facility]   Phone: [phone]       Next Steps: Follow up    Instructions: Hospice              After-discharge care                Destination       [facility]   Service: Nursing Home    [address] [place] [id number]   Phone: [phone]                             Discharge to [facility] in hospice.

## 2023-04-01 NOTE — ASSESSMENT & PLAN NOTE
-Geodon as needed  -avoid benzodiazepines   -delirium precautions    Patient was seen by psychiatry, Dr. Figueredo, on 3/15/23 who recommended continuing duloxetine 30mg BID and depakote 250 mg PO BID for delirium.   -will consult Telepsych for direction  -currently on ziprasidone IM prn    3/3/23  Telepsych evaluated, Dr. Greene, and recommended sitter or tele sitter at all times, he does not have the capacity to refuse necessary medical care at this time, no standing psychotropic meds at this time, Haldol PRN, and EKG/Qtc if he receives Haldol.    3/24/23  Avoid sedatives  Continue Haldol prn    3/25/23  Improving     3/28/23  Re-consulted Tele-psychiatry today  Started on Zyprexa 5 mg qHS    3/29/23  No reported agitation, hallucinations  Continue Zyprexa 5 mg PO qHS    3/30/23   Now in restraints  Psychiatry consulted; no PEC at this time but patient unable to make his decisions  Palliative Care consulted to assist

## 2023-04-01 NOTE — PLAN OF CARE
Patient ready for discharge to hospice. Report was called to [facility] and transport has been set up. Patient belongings gathered into a bag, hearing aids on  at the bedside relayed in report for night nurse to put them with belongings once transportation arrives.

## 2023-04-01 NOTE — ASSESSMENT & PLAN NOTE
Family seeking [facility] placement.    3/23/23  He has been accepted to [facility] once medically stable  He will need TB and COVID tests prior to discharge (TB order placed)    3/27/23  Awaiting placement    3/30/23  Pending improvement with agitation for placement    3/31/23  Decision to pursue inpatient hospice.

## 2023-04-01 NOTE — DISCHARGE SUMMARY
[facility] Medicine  Discharge Summary      Patient Name: Regino Cowan  MRN: [id number]  [facility]: [id number]  Patient Class: IP- Inpatient  Admission Date: 3/21/2023  Hospital Length of Stay: 9 days  Discharge Date and Time: 3/31/2023 10:10 PM  Attending Physician: No att. providers found   Discharging Provider: Samy King MD  Primary Care Provider: Rod Norman MD    Primary Care Team: Networked reference to record PCT     HPI:   Mr. Cowan is a 72-year-old  male with PMH significant for liver cirrhosis, was hospitalized at [facility] for hepatic encephalopathy, and discharged earlier this afternoon.  Patient was treated with lactulose, rifaximin.  Developed delirium during this hospitalization, required treatment with Haldol, Geodon per Psychiatry.  Family wanted to pursue jail nursing home placement, process was initiated.  However patient had hepatology appointment today 3/21/23 with Dr. Munoz, hence was discharged from [facility].  Dr. Munoz evaluated patient in the clinic, and was sent to [facility] ED for admission for AMS and generalized deconditioning.  Ammonia level elevated 65.  Received lactulose in the ED. No family at the bedside.       * No surgery found *      Hospital Course:   Regino Johnson is a 72 year old male who was admitted to [facility] for hepatic encephalopathy. Patient presented to ED with AMS in setting of elevated ammonia. CT Head negative. Administration of PO lactulose difficult due to patient's agitation so hepatology recommended lactulose enemas.    03/23/23  Telepsych evaluated as patient had behavioral issues yesterday, recommended sitter or tele sitter at all times, he has no capacity to refuse necessary medical care at this time, Haldol PRN for agitation, and EKG if he receives Haldol. Agitation may have been from increased ammonia level as he had not had a BM. Overnight patient had several BMs post  lactulose enemas. More alert and responsive today. Calm and cooperative. Ammonia level 68 from 119.  He has been accepted to [facility] for NH placement once medically stable.     3/24/23  Anticipate discharge to [facility] on Monday. Continue laxatives/Rifaximin for hepatic encephalopathy.     3/27/23  Reviewed overnight events. Haldol IM given. Patient awake, alert this morning, dressed in preperation to leave. NH requesting patient be out of restraints x 72 hours per  and nursing staff. Plan to d/c in AM if remains stable. Updated patient's daughter over phone.    3/28/23  Patient received dose of IM haldol again overnight due to reported agitation issues. Re-consulted tele-psychiatry, recommendations appreciated, started on Zyprexa 5 mg PO qHS. Patient resting in bed this afternoon, no new issues otherwise. Remains medically stable for transfer to SNF/NH.    3/29/23  NAEON. Started on zyprexa PO last night. Did not require IM haldol. Patient calm, awake, alert to self, place, situation. Patient refused morning meds, including lactulose. Encouraged patient to his medications as requested. Son-in-law at bedside, updated.    3/30/23  Overnight events reviewed, now in restraints and facemask applied as he was noted to be spitting on staff. Patient is awake, oriented to self only, confused. Palliative Care to see today, appreciate assistance.    3/31/23  Palliative Care team held family discussion over phone this afternoon, decision for DNR status and inpatient hospice placement, please refer to Palliative Care note for further details. Stable for discharge to inpatient hospice.       Goals of Care Treatment Preferences:  Code Status: DNR          What is most important right now is to focus on avoiding the hospital, symptom/pain control, quality of life, even if it means sacrificing a little time, comfort and QOL .  Accordingly, we have decided that the best  plan to meet the patient's goals includes enrolling in hospice care.      Consults:   Consults (From admission, onward)        Status Ordering Provider     Inpatient consult to Social Work  Once        Provider:  (Not yet assigned)    Completed WILLIAM MONTGOMERY     Inpatient consult to Social Work  Once        Provider:  (Not yet assigned)    Completed LOLA MELGAR     Inpatient consult to Telemedicine - Psych  Once        Provider:  Miguel Ángel Esteves MD    Completed MONTGOMERY, WILLIAM     Inpatient consult to Palliative Care  Once        Provider:  Lola Melgar NP    Completed MONTGOMERY, WILLIAM     Inpatient consult to Social Work  Once        Provider:  (Not yet assigned)    Completed GLENIS LOGAN     Inpatient Consult to Telemedicine - Hepatology  Once        Provider:  Hannah Munoz MD    Completed GLENIS LOGAN          Neuro  Delirium  -Geodon as needed  -avoid benzodiazepines   -delirium precautions    Patient was seen by psychiatry, Dr. Figueredo, on 3/15/23 who recommended continuing duloxetine 30mg BID and depakote 250 mg PO BID for delirium.   -will consult Telepsych for direction  -currently on ziprasidone IM prn    3/3/23  Telepsych evaluated, Dr. Greene, and recommended sitter or tele sitter at all times, he does not have the capacity to refuse necessary medical care at this time, no standing psychotropic meds at this time, Haldol PRN, and EKG/Qtc if he receives Haldol.    3/24/23  Avoid sedatives  Continue Haldol prn    3/25/23  Improving     3/28/23  Re-consulted Tele-psychiatry today  Started on Zyprexa 5 mg qHS    3/29/23  No reported agitation, hallucinations  Continue Zyprexa 5 mg PO qHS    3/30/23   Now in restraints  Psychiatry consulted; no PEC at this time but patient unable to make his decisions  Palliative Care consulted to assist    Renal/  Hypokalemia  Resolved    GI  * Acute hepatic encephalopathy  -ammonia level elevated 65.    -lactulose 10 g p.o. t.i.d..    -repeat ammonia level in  a.m..  -continue rifaximin    3/22/23  Ammonia higher today  Add lactulose enemas. Can discontinue once patient has bowel movements  Hepatology input appreciated  Continue management as above    3/23/23  Ammonia level improved to 68 post lactulose enemas  Continue Rifaximin  Monitor    3/24/22  Unable to get labs today  Continue lactulose enemas and Rifaximin    3/25/23  Improving  Patient is oriented and cooperative today    3/28/23  Continue lactulose and rifaximin    3/29/23  Patient refused meds this AM, encourage to take  RN to re-attempt to give meds this afternoon  Overall, mental status improved since admission  Currently awake, alert, following commands, oriented to self, place    3/30/23  Placed in restraints and facemask applied overnight  Patient is awake, oriented to self; refusing medications  Patient not cooperative, reviewed events  Palliative Care consulted for [facility], appreciate assistance    3/31/23  Continue lactulose and rifaximin on discharge  Patient compliance likely to be an issue    Liver cirrhosis  Followed by Dr. Munoz in the hepatology clinic  Per GI recommendations from S , outpatient TIPS in the near future    Other  Debility  Family seeking prison NH placement.    3/23/23  He has been accepted to [facility] once medically stable  He will need TB and COVID tests prior to discharge (TB order placed)    3/27/23  Awaiting placement    3/30/23  Pending improvement with agitation for placement    3/31/23  Decision to pursue inpatient hospice.      Final Active Diagnoses:    Diagnosis Date Noted POA    PRINCIPAL PROBLEM:  Acute hepatic encephalopathy [K76.82] 02/05/2023 Yes    Delirium [R41.0] 03/16/2023 Yes    Liver cirrhosis [K74.60]  Yes    Hypokalemia [E87.6] 05/11/2022 Yes    Debility [R53.81] 02/19/2023 Yes      Problems Resolved During this Admission:       Discharged Condition: poor    Disposition: Hospice/Medical Facility    Follow Up:   Contact  information for follow-up providers     Hannah Munoz MD. Schedule an appointment as soon as possible for a visit in 2 week(s).    Specialties: Gastroenterology, Hepatology  Contact information:  [address] [facility]  [place] [id number] [phone]             [facility] Follow up.    Specialty: Hospice Services  Why: Hospice  Contact information:  [place]  [phone]                   Contact information for after-discharge care     Destination     [facility] .    Service: Nursing Home  Contact information:  [address] [address] [phone]                           Patient Instructions:   No discharge procedures on file.    Significant Diagnostic Studies: Labs:   CMP   Recent Labs   Lab 03/31/23  0551      K 3.8   *   CO2 21*   GLU 82   BUN 13   CREATININE 0.8   CALCIUM 9.0   PROT 5.6*   ALBUMIN 2.7*   BILITOT 1.6*   ALKPHOS 80   AST 53*   ALT 24   ANIONGAP 10    and CBC   Recent Labs   Lab 03/31/23  0551   WBC 6.24   HGB 11.5*   HCT 32.1*   *       Pending Diagnostic Studies:     None         Medications:  Reconciled Home Medications:      Medication List      START taking these medications    * OLANZapine 7.5 MG tablet  Commonly known as: ZyPREXA  Take 1 tablet (7.5 mg total) by mouth every evening.     * OLANZapine 2.5 MG tablet  Commonly known as: ZyPREXA  Take 1 tablet (2.5 mg total) by mouth once daily.         * This list has 2 medication(s) that are the same as other medications prescribed for you. Read the directions carefully, and ask your doctor or other care provider to review them with you.            CONTINUE taking these medications    acetaminophen 325 MG tablet  Commonly known as: TYLENOL  Take 2 tablets (650 mg total) by mouth every 8 (eight) hours as needed for Temperature greater than (or equal to 101 degree F).     finasteride 5 mg tablet  Commonly known as: PROSCAR  Take 1 tablet (5 mg total) by mouth once  daily.     folic acid 1 MG tablet  Commonly known as: FOLVITE  Take 1 tablet (1 mg total) by mouth once daily.     furosemide 40 MG tablet  Commonly known as: LASIX  Take 40 mg by mouth once daily.     * lactulose 20 gram/30 mL Soln  Commonly known as: CHRONULAC  Take 30 mLs (20 g total) by mouth 3 (three) times daily.     * lactulose 20 gram/30 mL Soln  Commonly known as: CHRONULAC  Take 30 mLs (20 g total) by mouth daily as needed. Extra dosing for increased confusion or constipation/reduced bowel movements less then 3x daily     multivitamin Tab  Take 1 tablet by mouth once daily.     pantoprazole 40 MG tablet  Commonly known as: PROTONIX  Take 1 tablet (40 mg total) by mouth once daily.     potassium chloride 10 MEQ Tbsr  Commonly known as: KLOR-CON  Take 10 mEq by mouth once daily.     propranoloL 10 MG tablet  Commonly known as: INDERAL  Take 10 mg by mouth 2 (two) times daily.     rifAXIMin 550 mg Tab  Commonly known as: XIFAXAN  Take 550 mg by mouth 2 (two) times daily.     spironolactone 25 MG tablet  Commonly known as: ALDACTONE  Take 1 tablet (25 mg total) by mouth once daily.     thiamine 100 MG tablet  Take 1 tablet (100 mg total) by mouth once daily.         * This list has 2 medication(s) that are the same as other medications prescribed for you. Read the directions carefully, and ask your doctor or other care provider to review them with you.            STOP taking these medications    DULoxetine 30 MG capsule  Commonly known as: CYMBALTA            Indwelling Lines/Drains at time of discharge:   Lines/Drains/Airways     None                 Time spent on the discharge of patient: > 30 minutes         Samy King MD  Department of Hospital Medicine  [facility]

## 2023-04-01 NOTE — NURSING
Pt discharged to [facility] by [facility]. No IV in place, no heart monitor in place. Personal belongings and AVS sent with pt. Both hearing aids and  ensured to be in bag of clothes.

## 2023-04-01 NOTE — ASSESSMENT & PLAN NOTE
-ammonia level elevated 65.    -lactulose 10 g p.o. t.i.d..    -repeat ammonia level in a.m..  -continue rifaximin    3/22/23  Ammonia higher today  Add lactulose enemas. Can discontinue once patient has bowel movements  Hepatology input appreciated  Continue management as above    3/23/23  Ammonia level improved to 68 post lactulose enemas  Continue Rifaximin  Monitor    3/24/22  Unable to get labs today  Continue lactulose enemas and Rifaximin    3/25/23  Improving  Patient is oriented and cooperative today    3/28/23  Continue lactulose and rifaximin    3/29/23  Patient refused meds this AM, encourage to take  RN to re-attempt to give meds this afternoon  Overall, mental status improved since admission  Currently awake, alert, following commands, oriented to self, place    3/30/23  Placed in restraints and facemask applied overnight  Patient is awake, oriented to self; refusing medications  Patient not cooperative, reviewed events  Palliative Care consulted for [facility], appreciate assistance    3/31/23  Continue lactulose and rifaximin on discharge  Patient compliance likely to be an issue